# Patient Record
Sex: FEMALE | Race: WHITE | NOT HISPANIC OR LATINO | Employment: FULL TIME | URBAN - METROPOLITAN AREA
[De-identification: names, ages, dates, MRNs, and addresses within clinical notes are randomized per-mention and may not be internally consistent; named-entity substitution may affect disease eponyms.]

---

## 2020-03-24 ENCOUNTER — VIRTUAL VISIT (OUTPATIENT)
Dept: FAMILY MEDICINE | Facility: OTHER | Age: 35
End: 2020-03-24

## 2020-03-25 NOTE — PROGRESS NOTES
"Date: 2020 12:00:12  Clinician: Amrit Mendoza  Clinician NPI: [id number]  Patient: Zee Miller  Patient : 1985  Patient Address: [address] [place] [address]  Patient Phone: [phone]  Visit Protocol: URI  Patient Summary:  Zee is a 34 year old ( : 1985 ) female who initiated a Visit for COVID-19 (Coronavirus) evaluation and screening. When asked the question \"Please sign me up to receive news, health information and promotions. \", Zee responded \"No\".    Zee states her symptoms started 1-2 days ago.   Her symptoms consist of rhinitis, enlarged lymph nodes, myalgia, a sore throat, a cough, nasal congestion, malaise, and a headache. She is experiencing mild difficulty breathing with activities but can speak normally in full sentences.   Symptom details     Nasal secretions: The color of her mucus is clear, blood-tinged, and white.    Cough: Zee coughs a few times an hour and her cough is not more bothersome at night. Phlegm does not come into her throat when she coughs. She does not believe her cough is caused by post-nasal drip.     Sore throat: Zee reports having mild throat pain (1-3 on a 10 point pain scale), does not have exudate on her tonsils, and can swallow liquids. The lymph nodes in her neck are enlarged. A rash has not appeared on the skin since the sore throat started.     Headache: She states the headache is mild (1-3 on a 10 point pain scale).      Zee denies having ear pain, facial pain or pressure, wheezing, chills, teeth pain, and fever. She also denies taking antibiotic medication for the symptoms and having recent facial or sinus surgery in the past 60 days.   Precipitating events  Within the past week, Zee has not been exposed to someone with strep throat. She has not recently been exposed to someone with influenza. Zee has been in close contact with the following high risk individuals: immunocompromised people, adults " 65 or older, pregnant women, children under the age of 5, and people with asthma, heart disease or diabetes.   Pertinent COVID-19 (Coronavirus) information  Zee has not traveled internationally or to the areas where COVID-19 (Coronavirus) is widespread, including cruise ship travel in the last 14 days before the start of her symptoms.   Zee has not had a close contact with a laboratory-confirmed COVID-19 patient within 14 days of symptom onset. She has had a close contact with a suspected COVID-19 patient within 14 days of symptom onset. 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 She does not provide direct patient care.   Pertinent medical history  Zee does not get yeast infections when she takes antibiotics.   Zee needs a return to work/school note.   Weight: 150 lbs   Zee does not smoke or use smokeless tobacco.   She denies pregnancy and denies breastfeeding. She has menstruated in the past month.   Weight: 150 lbs    MEDICATIONS: lorazepam oral, propranolol oral, ALLERGIES: ibuprofen  Clinician Response:  Dear Zee,   Based on the information you have provided, you do have symptoms that are consistent with Coronavirus (COVID-19).   The coronavirus causes mild to severe respiratory illness with the most common symptoms including fever, cough and difficulty breathing. Unfortunately, many viruses cause similar symptoms and it can be difficult to distinguish between viruses, especially in mild cases, so we are presuming that anyone with cough or fever has coronavirus at this time.  Coronavirus/COVID-19 has reached the point of community spread in [place], meaning that we are finding the virus in people with no known exposure risk for jesus the virus. Given the increasing commonness  of coronavirus in the community we are no longer testing patients who are not critically ill.  If you are a health care worker, you should refer to your employee health office for instructions about testing and returning to work.  For everyone else who has cough or fever, you should assume you are infected with coronavirus. Since you will not be tested but have symptoms that may be consistent with coronavirus, the CDC recommends you stay in self-isolation until these three things have happened:    You have had no fever for at least 72 hours (that is three full days of no fever without the use of medicine that reduces fevers)    AND   Other symptoms have improved (for example, when your cough or shortness of breath have improved)   AND   At least 7 days have passed since your symptoms first appeared.   How to Isolate:    Isolate yourself at home.   Do Not allow any visitors  Do Not go to work or school  Do Not go to Taoist,  centers, shopping, or other public places.  Do Not shake hands.  Avoid close contact with others (hugging, kissing).   Protect Others:    Cover Your Mouth and Nose with a mask, disposable tissue or wash cloth to avoid spreading germs to others.  Wash your hands and face frequently with soap and water.   Managing Symptoms:    At this time, we primarily recommend Tylenol (Acetaminophen) for fever or pain. If you have liver or kidney problems, contact your primary care provider for instructions on use of tylenol. Adults can take 650 mg (two 325 mg pills) by mouth every 4-6 hours as needed OR 1,000 mg (two 500 mg pills) every 8 hours as needed. MAXIMUM DAILY DOSE: 3,000mg. For children, refer to dosing on bottle based on age or weight.   If you develop significant shortness of breath that prevents you from doing normal activities, please call 911 or proceed to the nearest emergency room and alert them immediately that you have been in self-isolation for possible coronavirus.   For more  information about COVID19 and options for caring for yourself at home, please visit the CDC website at https://www.cdc.gov/coronavirus/2019-ncov/about/steps-when-sick.htmlFor more options for care at [facility], please visit our website at [email or web address]     Diagnosis: Cough  Diagnosis ICD: R05

## 2020-08-20 LAB
DEPRECATED S PYO AG THROAT QL EIA: NEGATIVE
SPECIMEN SOURCE: NORMAL
SPECIMEN SOURCE: NORMAL
STREP GROUP A PCR: NOT DETECTED

## 2020-08-20 NOTE — PROGRESS NOTES
"Date: 2020 08:48:59  Clinician: Prosper Payton  Clinician NPI: [id number]  Patient: Zee [place]  Patient : 1985  Patient Address: [address] [place] [address]  Patient Phone: [phone]  Visit Protocol: URI  Patient Summary:  Zee is a 35 year old ( : 1985 ) female who initiated a Visit for cold, sinus infection, or influenza. When asked the question \"Please sign me up to receive news, health information and promotions. \", Zee responded \"No\".    Zee states her symptoms started 1-2 days ago.   Her symptoms consist of a headache, malaise, a sore throat, nasal congestion, and rhinitis.   Symptom details     Nasal secretions: The color of her mucus is yellow.    Sore throat: Zee reports having moderate throat pain (4-6 on a 10 point pain scale), has exudate on her tonsils, and can swallow liquids. She is not sure if the lymph nodes in her neck are enlarged. A rash has not appeared on the skin since the sore throat started.     Headache: She states the headache is mild (1-3 on a 10 point pain scale).      Zee denies having ear pain, chills, fever, wheezing, teeth pain, ageusia, diarrhea, cough, vomiting, nausea, myalgias, anosmia, and facial pain or pressure. She also denies having a sinus infection within the past year, having recent facial or sinus surgery in the past 60 days, and taking antibiotic medication in the past month. She is not experiencing dyspnea.   Precipitating events  Zee is not sure if she has been exposed to someone with strep throat.   Pertinent COVID-19 (Coronavirus) information  In the past 14 days, Zee has not worked in a congregate living setting.   She either works or volunteers as a healthcare worker or a , or works or volunteers in a healthcare facility. She does not provide direct patient care. 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 " [employer]   Zee also has not lived in a congregate living setting in the past 14 days. She lives with a healthcare worker.   Zee has not had a close contact with a laboratory-confirmed COVID-19 patient within 14 days of symptom onset.   Since December 2019, Zee and has had upper respiratory infection (URI) or influenza-like illness. Has not been diagnosed with lab-confirmed COVID-19 test      Date(s) of previous URI or influenza-like illness (free-text): End of February     Symptoms Zee experienced during previous URI or influenza-like illness as reported by the patient (free-text): Burning in chest, cough, slight fever        Pertinent medical history  Zee does not get yeast infections when she takes antibiotics.   Zee needs a return to work/school note.   Weight: 160 lbs   Zee does not smoke or use smokeless tobacco.   She denies pregnancy and denies breastfeeding. She is currently menstruating.   Additional information as reported by the patient (free text): I do believe that I just have a sore throat/ strep   Weight: 160 lbs    MEDICATIONS: lorazepam oral, propranolol oral, ALLERGIES: ibuprofen  Clinician Response:  Dear Zee,   .       Rapid Strep Test - [place]    I am sorry you are not feeling well. Based on your lab results, you do not have strep throat. This means your symptoms are caused by a virus and not the bacteria that causes strep throat. Antibiotic medications are not effective against a viral infection and will not help symptoms improve or make the condition less contagious.  The following tips will keep you as comfortable as possible while you recover:     Rest    Drink plenty of water and other liquids    Take a hot shower to loosen congestion    Use throat lozenges    Gargle with warm salt water (1/4 teaspoon of salt per 8 ounce glass of water)    Suck on frozen items such as popsicles or ice cubes    Drink hot tea with lemon and honey     Please be seen in a  clinic or urgent care if new symptoms develop, or symptoms become worse.  Call 911 or go to the emergency room if you suddenly develop a rash, are drooling or unable to swallow fluids, if you are having difficulty breathing, or feel that your throat is closing off.  Because strep throat can be easily spread to others, proof of evaluation by a provider is often requested before returning to work, school, or . The statement below summarizes my evaluation. Please print a copy of this Visit if written verification is needed.  Zee was evaluated for strep throat and lab testing was completed. As a result, strep throat was ruled out and symptoms are the result of a viral infection. Antibiotics were not prescribed because they are not an effective treatment for viral infections and will not make it less contagious. Zee can return to work, school, or  if she has not had a fever for 24 hours without the use of a fever-reducing medication and feels well enough for daily activities.   Diagnosis: Pain in throat  Diagnosis ICD: R07.0  [facility] Results: STRAR: Negative  No Group A streptococcal antigen detected by immunoassay. Confirmatory testing  in progress.  [employer] Secondary Results: STRPA: Not Detected  Group A Streptococcus DNA is not detected.  FDA approved assay performed using [employer] GeneXpert real-time PCR.

## 2020-08-21 ENCOUNTER — VIRTUAL VISIT (OUTPATIENT)
Dept: LAB | Facility: CLINIC | Age: 35
End: 2020-08-21

## 2020-08-21 DIAGNOSIS — R07.0 THROAT PAIN: Primary | ICD-10-CM

## 2021-05-01 ENCOUNTER — HEALTH MAINTENANCE LETTER (OUTPATIENT)
Age: 36
End: 2021-05-01

## 2021-05-15 ENCOUNTER — IMMUNIZATION (OUTPATIENT)
Dept: LAB | Facility: CLINIC | Age: 36
End: 2021-05-15
Payer: COMMERCIAL

## 2021-05-17 ENCOUNTER — OFFICE VISIT (OUTPATIENT)
Dept: FAMILY MEDICINE | Facility: CLINIC | Age: 36
End: 2021-05-17
Payer: COMMERCIAL

## 2021-05-17 VITALS
TEMPERATURE: 96.8 F | SYSTOLIC BLOOD PRESSURE: 120 MMHG | HEART RATE: 131 BPM | OXYGEN SATURATION: 98 % | WEIGHT: 164 LBS | BODY MASS INDEX: 24.86 KG/M2 | DIASTOLIC BLOOD PRESSURE: 80 MMHG | RESPIRATION RATE: 16 BRPM | HEIGHT: 68 IN

## 2021-05-17 DIAGNOSIS — H92.03 OTALGIA, BILATERAL: Primary | ICD-10-CM

## 2021-05-17 PROCEDURE — 99213 OFFICE O/P EST LOW 20 MIN: CPT | Performed by: NURSE PRACTITIONER

## 2021-05-17 RX ORDER — PROPRANOLOL HYDROCHLORIDE 20 MG/1
20 TABLET ORAL 2 TIMES DAILY
COMMUNITY
Start: 2019-06-05 | End: 2023-01-25

## 2021-05-17 RX ORDER — LORAZEPAM 0.5 MG/1
.5-1 TABLET ORAL
COMMUNITY
Start: 2017-01-13 | End: 2021-11-17

## 2021-05-17 SDOH — HEALTH STABILITY: MENTAL HEALTH: HOW OFTEN DO YOU HAVE 6 OR MORE DRINKS ON ONE OCCASION?: NOT ASKED

## 2021-05-17 SDOH — HEALTH STABILITY: MENTAL HEALTH: HOW OFTEN DO YOU HAVE A DRINK CONTAINING ALCOHOL?: 2-4 TIMES A MONTH

## 2021-05-17 SDOH — HEALTH STABILITY: MENTAL HEALTH: HOW MANY STANDARD DRINKS CONTAINING ALCOHOL DO YOU HAVE ON A TYPICAL DAY?: 1 OR 2

## 2021-05-17 ASSESSMENT — MIFFLIN-ST. JEOR: SCORE: 1492.4

## 2021-05-17 NOTE — PATIENT INSTRUCTIONS
Patient Education     Treating TMJ Disorders with Heat and Cold  Heat therapy  Use heat for comfort during exercise. Heat increases blood flow and helps the tissues to relax. This makes movement easier. Moving the muscles and joints will restore them to full function.  You may stretch during heat therapy if it does not increase your pain, or if your therapist asks you to. After heat therapy, exercise as directed.  To apply heat, do one of the following:    Use a Bed Buddy Hot and Cold Pack over the jaw muscles. You may buy this from the drug store. Follow the heating directions on the package.    Use a damp hand towel.  1. Wring out the towel and place it in a microwave for 20 to 30 seconds.  2. Gently shake out any hot spots before placing it over the jaw muscles. Be sure to include the muscles above and below the jaw joints.  3. Reheat as needed for a total heating time of 15 to 20 minutes.  Cold therapy  Cold should be your first treatment for the first 24 to 48 hours after an injury or surgery.  You can also use cold to relieve inflammation (when your jaw feels swollen, tender and warmer than normal). It helps with pain and discomfort caused by exercise or severe muscle spasms. If you use cold before doing gentle stretches, it may help prevent pain.  To apply cold, try one of the following:    Buy a cold gel-pack from the drug store. Wrap it in a damp towel before placing it on the skin.    Use a plastic bag filled with ice.  1. Fill a Ziplock bag with ice chips and wrap it in damp towel or washcloth.  2. Place the ice pack over the jaw muscles. Be sure to include the muscles above and below the jaw joints.  3. Hold it in place 5 to 10 minutes or until the skin feels numb.    Use ice massage.  1. Freeze water in a small paper or Styrofoam cup.  2. Remove the paper from the rim of the cup.  3. Stroke the ice over the muscles and joints. Stop when the area feels numb.  When using cold therapy, you will notice  that the skin will first feel cold, then painful. The feeling will progress to a deep ache and finally to numbness.   When you are numb, remove the cold.  Contrast packs  The use of contrast packs--switching off between heat and cold--helps to reduce severe muscle spasm.  1. First use heat for 4 minutes, then cold for 1 minute.  2. Keep changing from hot to cold for 20 to 30 minutes.  3. End with the use of heat for 10 minutes.  For informational purposes only. Not to replace the advice of your health care provider.   Copyright   2007 [employer] [facility]. All rights reserved. [employer] [id number] - REV 06/15.

## 2021-10-10 ENCOUNTER — HEALTH MAINTENANCE LETTER (OUTPATIENT)
Age: 36
End: 2021-10-10

## 2021-11-17 ENCOUNTER — OFFICE VISIT (OUTPATIENT)
Dept: FAMILY MEDICINE | Facility: CLINIC | Age: 36
End: 2021-11-17
Payer: COMMERCIAL

## 2021-11-17 VITALS
HEIGHT: 68 IN | SYSTOLIC BLOOD PRESSURE: 120 MMHG | DIASTOLIC BLOOD PRESSURE: 78 MMHG | RESPIRATION RATE: 20 BRPM | OXYGEN SATURATION: 98 % | HEART RATE: 77 BPM | TEMPERATURE: 98.5 F | BODY MASS INDEX: 25.54 KG/M2 | WEIGHT: 168.5 LBS

## 2021-11-17 DIAGNOSIS — F41.9 ANXIETY: ICD-10-CM

## 2021-11-17 DIAGNOSIS — R46.89 BEHAVIOR CONCERN: Primary | ICD-10-CM

## 2021-11-17 DIAGNOSIS — F41.0 PANIC ATTACK: ICD-10-CM

## 2021-11-17 DIAGNOSIS — Z79.899 CONTROLLED SUBSTANCE AGREEMENT SIGNED: ICD-10-CM

## 2021-11-17 DIAGNOSIS — F33.0 MILD RECURRENT MAJOR DEPRESSION (H): ICD-10-CM

## 2021-11-17 DIAGNOSIS — Z97.5 NEXPLANON IN PLACE: ICD-10-CM

## 2021-11-17 PROCEDURE — 99214 OFFICE O/P EST MOD 30 MIN: CPT | Performed by: PHYSICIAN ASSISTANT

## 2021-11-17 PROCEDURE — 96127 BRIEF EMOTIONAL/BEHAV ASSMT: CPT | Performed by: PHYSICIAN ASSISTANT

## 2021-11-17 RX ORDER — LOPERAMIDE HCL 2 MG
2 CAPSULE ORAL PRN
COMMUNITY
Start: 2020-06-22

## 2021-11-17 ASSESSMENT — ANXIETY QUESTIONNAIRES
2. NOT BEING ABLE TO STOP OR CONTROL WORRYING: SEVERAL DAYS
6. BECOMING EASILY ANNOYED OR IRRITABLE: NOT AT ALL
IF YOU CHECKED OFF ANY PROBLEMS ON THIS QUESTIONNAIRE, HOW DIFFICULT HAVE THESE PROBLEMS MADE IT FOR YOU TO DO YOUR WORK, TAKE CARE OF THINGS AT HOME, OR GET ALONG WITH OTHER PEOPLE: SOMEWHAT DIFFICULT
GAD7 TOTAL SCORE: 4
3. WORRYING TOO MUCH ABOUT DIFFERENT THINGS: NOT AT ALL
5. BEING SO RESTLESS THAT IT IS HARD TO SIT STILL: SEVERAL DAYS
7. FEELING AFRAID AS IF SOMETHING AWFUL MIGHT HAPPEN: SEVERAL DAYS
1. FEELING NERVOUS, ANXIOUS, OR ON EDGE: SEVERAL DAYS

## 2021-11-17 ASSESSMENT — MIFFLIN-ST. JEOR: SCORE: 1507.81

## 2021-11-17 ASSESSMENT — PATIENT HEALTH QUESTIONNAIRE - PHQ9: 5. POOR APPETITE OR OVEREATING: NOT AT ALL

## 2021-11-17 NOTE — LETTER
[facility] [place]  11/17/21  Patient: Zee Miller  YOB: 1985  Medical Record Number: [id number]                                                                                  Non-Opioid Controlled Substance Agreement    This is an agreement between you and your provider regarding safe and appropriate use of controlled substances prescribed by your care team. Controlled substances are?medicines that can cause physical and mental dependence (abuse).     There are strict laws about having and using these medicines. We here at [facility] are  committed to working with you in your efforts to get better. To support you in this work, we'll help you schedule regular office appointments for medicine refills. If we must cancel or change your appointment for any reason, we'll make sure you have enough medicine to last until your next appointment.     As a Provider, I will:     Listen carefully to your concerns while treating you with respect.     Recommend a treatment plan that I believe is in your best interest and may involve therapies other than medicine.      Talk with you often about the possible benefits and the risk of harm of any medicine that we prescribe for you.    Assess the safety of this medicine and check how well it works.      Provide a plan on how to taper (discontinue or go off) using this medicine if the decision is made to stop its use.      ::  As a Patient, I understand controlled substances:       Are prescribed by my care provider to help me function or work and manage my condition(s).?    Are strong medicines and can cause serious side effects.       Need to be taken exactly as prescribed.?Combining controlled substances with certain medicines or chemicals (such as illegal drugs, alcohol, sedatives, sleeping pills, and benzodiazepines) can be dangerous or even fatal.? If I stop taking my medicines suddenly, I may have severe withdrawal symptoms.     The  risks, benefits, and side effects of these medicine(s) were explained to me. I agree that:    1. I will take part in other treatments as advised by my care team. This may be psychiatry or counseling, physical therapy, behavioral therapy, group treatment or a referral to specialist.    2. I will keep all my appointments and understand this is part of the monitoring of controlled substances.?My care team may require an office visit for EVERY controlled substance refill. If I miss appointments or don t follow instructions, my care team may stop my medicine    3. I will take my medicines as prescribed. I will not change the dose or schedule unless my care team tells me to. There will be no refills if I run out early.      4. I may be asked to come to the clinic and complete a urine drug test or complete a pill count. If I don t give a urine sample or participate in a pill count, the care team may stop my medicine.    5. I will only receive controlled substance prescriptions from this clinic. If I am treated by another provider, I will tell them that I am taking controlled substances and that I have a treatment agreement with this provider. I will inform my [facility] care team within one business day if I am given a prescription for any controlled substance by another healthcare provider. My [facility] care team can contact other providers and pharmacists about my use of any medicines.    6. It is up to me to make sure that I don't run out of my medicines on weekends or holidays.?If my care team is willing to refill my prescription without a visit, I must request refills only during office hours. Refills may take up to 3 business days to process. I will use one pharmacy to fill all my controlled substance prescriptions. I will notify the clinic about any changes to my insurance or medicine availability.    7. I am responsible for my prescriptions. If the medicine/prescription is lost, stolen or destroyed,  it will not be replaced.?I also agree not to share controlled substance medicines with anyone.     8. I am aware I should not use any illegal or recreational drugs. I agree not to drink alcohol unless my care team says I can.     9. If I enroll in the [place] Medical Cannabis program, I will tell my care team before my next refill.    10. I will tell my care team right away if I become pregnant, have a new medical problem treated outside of my regular clinic, or have a change in my medicines.     11. I understand that this medicine can affect my thinking, judgment and reaction time.? Alcohol and drugs affect the brain and body, which can affect the safety of my driving. Being under the influence of alcohol or drugs can affect my decision-making, behaviors, personal safety and the safety of others. Driving while impaired (DWI) can occur if a person is driving, operating or in physical control of a car, motorcycle, boat, snowmobile, ATV, motorbike, off-road vehicle or any other motor vehicle ([place] Statute 169A.20). I understand the risk if I choose to drive or operate any vehicle or machinery.    I understand that if I do not follow any of the conditions above, my prescriptions or treatment may be stopped or changed.   I agree that my provider, clinic care team and pharmacy may work with any city, state or federal law enforcement agency that investigates the misuse, sale or other diversion of my controlled medicine. I will allow my provider to discuss my care with, or share a copy of, this agreement with any other treating provider, pharmacy or emergency room where I receive care.     I have read this agreement and have asked questions about anything I did not understand.    ________________________________________________________  Patient Signature - Zee Miller     ___________________                   Date     ________________________________________________________  Provider Signature - Aubrie KAUR  Suzette, PA-C       ___________________                   Date     ________________________________________________________  Witness Signature (required if provider not present while patient signing)          ___________________                   Date

## 2021-11-17 NOTE — PROGRESS NOTES
Assessment & Plan     Zee was seen today for consult.    Diagnoses and all orders for this visit:    Behavior concern  -     MENTAL HEALTH REFERRAL  - Adult; Assessments and Testing; ADHD; [facility] [phone]; We will contact you to schedule the appointment or please call with any questions; Future    Anxiety  -     MENTAL HEALTH REFERRAL  - Adult; Assessments and Testing; ADHD; [facility] [phone]; We will contact you to schedule the appointment or please call with any questions; Future  -     propranolol (INDERAL) 20 MG tablet; Take 1 tablet (20 mg) by mouth 2 times daily  -     LORazepam (ATIVAN) 0.5 MG tablet; Take 1 tablet (0.5 mg) by mouth daily as needed for anxiety (and panic attacks)    Panic attack  -     LORazepam (ATIVAN) 0.5 MG tablet; Take 1 tablet (0.5 mg) by mouth daily as needed for anxiety (and panic attacks)    Mild recurrent major depression (H)    Controlled substance agreement signed    Nexplanon in place - placed at [facility] 2/2019       New pt transition care as previous PCP no longer available. Concerned for possible ADHD so referral given to evaluation for further. Hx complicated by previously reported Dx of anxiety, depression and panic attacks currently on propranolol 20mg BID and ativan .5mg for panic attacks. Estimates use no more than 4-5x per month. Updated CSA, no concerns on . Discussed consideration to trial of another selective serotonin reuptake inhibitor especially if ADHD evaluation returns negative. Could consider sertraline. Pt will consider. Recheck 6 months at routine health physical.    Return in about 6 months (around 5/17/2022) for Preventive Visit, Mood Recheck.    Aubrie Bradford PA-C  [facility] [place]    Barrett Bullard is a 36 year old who presents for the following health issues     Previous provider moved back to [place] - Dr. Omar Barber at . No longer available.    Patient  "here to discuss possible ADHD; never been given this diagnosis in the past. Admits she's always thought it was a problem, but now is affecting ability to work.   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 Role seems to be changing consistently - they keep adding things on and less time to do things.  Has noticed increased concentration difficulties - catches self \"spacing out\", hard-time multitasking ie) can't type and listen at same time.  Lists are required to get things done and is no longer allowed to do these per scheduling protocols, thus, having a hard time following through with new expectations.  Constant fidgity  Affecting work    Wonders if mom has it, but was never tested for it.  Left public school and went to an alternative school in middle of 10th grade year due to similar concerns noted above. Had easier time doing things at her own pace.  PMHX significant for anxiety and depression - taking propranolol 20mg BID and ativan .5mg for break through anxiety - can go through 2 weeks without it then have a really bad week. Estimates about 4 pills per month - typically for panic attacks - can feel her heart beating, a lot of crying and despair.   Has tried 3 other meds, but didn't like how she functioned on - wellbutrin, citalopram       Current Outpatient Medications   Medication     etonogestrel (NEXPLANON) 68 MG IMPL     loperamide (IMODIUM) 2 MG capsule     LORazepam (ATIVAN) 0.5 MG tablet     propranolol (INDERAL) 20 MG tablet     propranolol (INDERAL) 20 MG tablet     No current facility-administered medications for this visit.     Allergies   Allergen Reactions     Acetaminophen Hives     Ibuprofen Hives     Aspirin Hives       Review of Systems   Constitutional, HEENT, cardiovascular, pulmonary, gi and gu, psych, neuro systems are negative, except as otherwise noted.      Objective    /78   Pulse 77   Temp 98.5  F (36.9  C) (Tympanic)   Resp 20 " "  Ht 1.727 m (5' 8\")   Wt 76.4 kg (168 lb 8 oz)   SpO2 98%   BMI 25.62 kg/m    Body mass index is 25.62 kg/m .  Physical Exam   GENERAL: healthy, alert and no distress  EYES: Eyes grossly normal to inspection, PERRL and conjunctivae and sclerae normal  RESP: lungs clear to auscultation - no rales, rhonchi or wheezes  CV: regular rates and rhythm and no murmur, click or rub  NEURO: Normal strength and tone, mentation intact and speech normal  PSYCH: mentation appears normal and anxious            "

## 2021-11-18 ASSESSMENT — ANXIETY QUESTIONNAIRES: GAD7 TOTAL SCORE: 4

## 2021-11-18 ASSESSMENT — PATIENT HEALTH QUESTIONNAIRE - PHQ9: SUM OF ALL RESPONSES TO PHQ QUESTIONS 1-9: 9

## 2021-11-21 PROBLEM — F33.0 MILD RECURRENT MAJOR DEPRESSION (H): Status: ACTIVE | Noted: 2021-11-21

## 2021-11-21 PROBLEM — F41.0 PANIC ATTACK: Status: ACTIVE | Noted: 2021-11-21

## 2021-11-21 RX ORDER — LORAZEPAM 0.5 MG/1
0.5 TABLET ORAL DAILY PRN
Qty: 30 TABLET | Refills: 0 | Status: SHIPPED | OUTPATIENT
Start: 2021-11-21 | End: 2023-01-25

## 2021-11-21 RX ORDER — PROPRANOLOL HYDROCHLORIDE 20 MG/1
20 TABLET ORAL 2 TIMES DAILY
Qty: 180 TABLET | Refills: 3 | Status: SHIPPED | OUTPATIENT
Start: 2021-11-21 | End: 2022-12-22

## 2021-12-07 ENCOUNTER — E-VISIT (OUTPATIENT)
Dept: FAMILY MEDICINE | Facility: CLINIC | Age: 36
End: 2021-12-07
Payer: COMMERCIAL

## 2021-12-07 DIAGNOSIS — R49.1 VOICE ABSENCE: Primary | ICD-10-CM

## 2021-12-07 PROCEDURE — 99421 OL DIG E/M SVC 5-10 MIN: CPT | Performed by: PHYSICIAN ASSISTANT

## 2021-12-07 NOTE — LETTER
[address] [address]  [phone]       December 7, 2021    Zee Miller  [address] [place] [id number]    To Whom it May Concern:    The above patient is unable to work in a setting where talking on the phone/conversations are required due to an underlying medical issue.  Once her voice has recovered, she can return to work and this capacity.  Please contact me with questions or concerns.      Sincerely,    Mala Coon PA-C

## 2021-12-08 NOTE — PATIENT INSTRUCTIONS
Patient Education     Laryngitis    Laryngitis is a swelling of the tissues around the vocal cords. Symptoms include a hoarse (scratchy) voice. Or your voice may be gone for a few days or longer. This may be caused by a viral illness, such as a head or chest cold. It may also be due to overuse and strain of your voice. Smoking, drinking alcohol, acid reflux, allergies, or inhaling harsh chemicals may also lead to symptoms. This condition will usually go away in 1 to 2 weeks.   Home care    Rest your voice until it recovers. Talk as little as possible. If your symptoms are severe, rest at home for a day or so.    Moist air may help your symptoms. Try breathing cool steam from a humidifier or vaporizer. Or breathe air from a steamy shower.    Drink plenty of fluids to stay well hydrated.    Don't smoke    Follow-up care  Follow up with your healthcare provider or this facility if you are not better after 1 week. If your hoarse voice lasts more than 2 weeks, you may need to see an otolaryngologist. This is a doctor who treats diseases and disorders of the ear, nose, and throat (ENT). Seeing this doctor is especially important if you have a history of alcohol or tobacco use.   When to seek medical advice  Contact your healthcare provider right away if you have any of the following:     Symptoms that get worse    Severe pain with swallowing    Trouble opening your mouth    Neck swelling, neck pain, or trouble moving your neck    Fever of 100.4 F (38. C) or higher, or as directed by your healthcare provider    Symptoms do not go away in 2 weeks  Call 911  Call 911 or seek immediate medical care if you have any of the following:     Noisy breathing or trouble breathing    Drooling or not able to swallow    Not able to talk    Feeling dizzy or lightheaded  [employer] last reviewed this educational content on 4/1/2020 2000-2021 [employer]. All rights reserved. This information is not intended as a  substitute for professional medical care. Always follow your healthcare professional's instructions.

## 2022-01-26 ENCOUNTER — E-VISIT (OUTPATIENT)
Dept: URGENT CARE | Facility: CLINIC | Age: 37
End: 2022-01-26
Payer: COMMERCIAL

## 2022-01-26 ENCOUNTER — MYC MEDICAL ADVICE (OUTPATIENT)
Dept: FAMILY MEDICINE | Facility: CLINIC | Age: 37
End: 2022-01-26

## 2022-01-26 DIAGNOSIS — J02.9 ACUTE PHARYNGITIS, UNSPECIFIED ETIOLOGY: Primary | ICD-10-CM

## 2022-01-26 DIAGNOSIS — Z20.822 SUSPECTED COVID-19 VIRUS INFECTION: Primary | ICD-10-CM

## 2022-01-26 PROCEDURE — 99421 OL DIG E/M SVC 5-10 MIN: CPT | Performed by: EMERGENCY MEDICINE

## 2022-01-26 NOTE — PATIENT INSTRUCTIONS
Ale[place],    Based on your responses, you may have coronavirus (COVID-19). This illness can cause fever, cough and trouble breathing. Many people get a mild case and get better on their own. Some people can get very sick.    Will I be tested for COVID-19?  We would like to test you for COVID-19 virus. I have placed orders for this test.     For all employees or close contacts (except [employer] and [employer] - see below), go to your [employer] home page and scroll down to the section that says  You have an appointment that needs to be scheduled  and click the large green button that says  Schedule Now  and follow the steps to find the next available opening.     If you are unable to complete these steps or if you cannot find any available times, please call [phone] to schedule employee testing.     [employer] employees or close contacts, please call [phone].   [place] ([employer]) employees or close contacts call [phone].    Return to work/school/ guidance:  Please let your workplace manager and staffing office know when your isolation ends.       If you receive a positive COVID-19 test result, follow the guidance of the those who are giving you the results. Usually the return to work is 10 days from symptom onset or positive test date, (or in some cases 20 days if you are immunocompromised). If your symptoms started after your positive test, the 10 days should start when your symptoms started.   o If you work at [employer], you must also be cleared by Employee Occupational Health and Safety to return to work.      If you receive a negative COVID-19 test result and did not have a high risk exposure to someone with a known positive COVID-19 test, you can return to work once you're free of fever for 24 hours without fever-reducing medication and your symptoms are improving or resolved.    If you receive a negative COVID-19 test and had a high-risk exposure to someone who has tested positive for  COVID-19 then you can return to work 14 days after your last contact with the positive individual. Follow quarantine guidance given by your doctor or public health officials.     Sign up for [employer].   We know it's scary to hear that you might have COVID-19. We want to track your symptoms to make sure you're okay over the next 2 weeks. Please look for an email from [employer]--this is a free, online program that we'll use to keep in touch. To sign up, follow the link in the email you will receive. Learn more at [email or web address]        How can I take care of myself?  Over the counter medications may help with your symptoms like congestion, cough, chills, or fever.   There are not many effective prescription treatments for early COVID-19. Hydroxychloroquine, ivermectin, and azithromycin are not effective or recommended for COVID-19.    If your symptoms started in the last 10 days, you may be able to receive a treatment with monoclonal antibodies. This treatment can lower your risk of severe illness and going to the hospital. It is given through an IV or under your skin (subcutaneous) and must be given at an infusion center. You must be 12 or older, weight at least 88 pounds, and have a positive COVID-19 test.    If you would like to sign up to be considered to receive the monoclonal antibody medicine, please complete a participation form through the [facility] here: MNRAP ([email or web address]). You may also call the [facility] COVID-19 Public Hotline at 1-375.156.2206 (open Mon-Fri: 9am-7pm and Sat: 10am-6pm).     Not all people who are eligible will receive the medicine, since supply is limited. You will be contacted in the next 1 to 2 business days only if you are selected. If you do not receive a call, you have not been selected to receive the medicine. If you have any questions about this medication, please contact your primary care  provider. For more information, see [email or web address]/[email or web address]/[email or web address]      Get lots of rest. Drink extra fluids (unless a doctor has told you not to)    Take Tylenol (acetaminophen) or ibuprofen for fever or pain. If you have liver or kidney problems, ask your family doctor if it's okay to take Tylenol o ibuprofen    Take over the counter medications for your symptoms, as directed by your doctor. You may also talk to your pharmacist.      If you have other health problems (like cancer, heart failure, an organ transplant or severe kidney disease): Call your specialty clinic if you don't feel better in the next 2 days.    Know when to call 911. Emergency warning signs include:  o Trouble breathing or shortness of breath  o Pain or pressure in the chest that doesn't go away  o Feeling confused like you haven't felt before, or not being able to wake up  o Bluish-colored lips or face    Where can I get more information?    [facility] - About COVID-19: [email or web address]     CDC - What to Do If You're Sick:     www.cdc.gov/coronavirus/2019-ncov/about/steps-when-sick.html    CDC - Ending Home Isolation:  https://www.cdc.gov/coronavirus/2019-ncov/your-health/quarantine-isolation.html     CDC - Caring for Someone:  www.cdc.gov/coronavirus/2019-ncov/if-you-are-sick/care-for-someone.html    [facility] clinical trials (COVID-19 research studies): clinicalaffairs.[facility]/n-clinical-trials    Below are the COVID-19 hotlines at the [facility] of Health ([facility]). Interpreters are available.  o For health questions: Call 604-858-6228 or [phone] (7 a.m. to 7 p.m.)  o For questions about schools and childcare: Call 199-570-3925 or [phone] (7 a.m. to 7 p.m.)  January 26, 2022  RE:  Zee Miller                                                                                                                  [address] [place] [address]  [id number]      To whom it may concern:    I evaluated Zee Miller on January 26, 2022. Zee Miller should be excused from work/school.     They should let their workplace manager and staffing office know when their quarantine ends.    We can not give an exact date as it depends on the information below. They can calculate this on their own or work with their manager/staffing office to calculate this. (For example if they were exposed on 10/04, they would have to quarantine for 14 full days. That would be through 10/18. They could return on 10/19.)    Quarantine Guidelines:    If patient receives a positive COVID-19 test result, they should follow the guidance of those who are giving the results. Usually the return to work is 10 (or in some cases 20 days from symptom onset.) If they work at [employer], they must be cleared by Employee Occupational Health and Safety to return to work.      If patient receives a negative COVID-19 test result and did not have a high risk exposure to someone with a known positive COVID-19 test, they can return to work once they're free of fever for 24 hours without fever-reducing medication and their symptoms are improving or resolved.    If patient receives a negative COVID-19 test and if they had a high risk exposure to someone who has tested positive for COVID-19 then they can return to work 14 days after their last contact with the positive individual    Note: If there is ongoing exposure to the covid positive person, this quarantine period may be longer than 14 days. (For example, if they are continually exposed to their child, who tested positive and cannot isolate from them, then the quarantine of 7-14 days can't start until their child is no longer contagious. This is typically 10 days from onset to the child's symptoms. So the total duration may be 17-24 days in this case.)     Sincerely,  Zack Cai MD          o

## 2022-05-22 ENCOUNTER — HEALTH MAINTENANCE LETTER (OUTPATIENT)
Age: 37
End: 2022-05-22

## 2022-09-24 ENCOUNTER — HEALTH MAINTENANCE LETTER (OUTPATIENT)
Age: 37
End: 2022-09-24

## 2022-11-07 ENCOUNTER — E-VISIT (OUTPATIENT)
Dept: FAMILY MEDICINE | Facility: CLINIC | Age: 37
End: 2022-11-07
Payer: COMMERCIAL

## 2022-11-07 DIAGNOSIS — R51.9 HEADACHE, UNSPECIFIED HEADACHE TYPE: Primary | ICD-10-CM

## 2022-11-07 PROCEDURE — 99207 PR NON-BILLABLE SERV PER CHARTING: CPT | Performed by: PHYSICIAN ASSISTANT

## 2022-11-17 NOTE — PROGRESS NOTES
"  Assessment & Plan     Otalgia, bilateral  Normal reassuring exam.   Ears without signs of infection nor cerumen impaction.   Eustachian tube dysfunction discussed.  Allergies is also a possibility discussed.  Could trail Sudafed twice daily for the next 5 to 7 days.  Instructed this is behind the counter with the pharmacy however does not need a prescription;  Could also use a nasal spray Nasonex, Nasacort, or Flonase avoid Afrin type products due to rebound nasal congestion. The combination of both can help with ear pain pressure.  Written education provided.   Encouraged her to be seen if symptoms persist or worsen.  Encourage follow-up as well as wellness examination examination.  Zee verbalizes understanding of plan of care and is in agreement.     Return in about 2 weeks (around 5/31/2021) for Recheck, Wellness exam fasting labs.    Michaela Hicks, [facility] ABRAHAM Bullard is a 35 year old who presents for the following health issues  HPI     Acute Illness  Acute illness concerns: EAR PAIN  Onset/Duration: 2 wks  Symptoms:  Fever: no  Chills/Sweats: no  Headache (location?): no  Sinus Pressure: no  Conjunctivitis:  no  Ear Pain: YES: left  Rhinorrhea: no  Congestion: no  Sore Throat: no  Cough: no  Wheeze: no  Decreased Appetite: no  Nausea: no  Vomiting: no  Diarrhea: no  Dysuria/Freq.: no  Dysuria or Hematuria: no  Fatigue/Achiness: no  Sick/Strep Exposure: no  Therapies tried and outcome: None      Review of Systems   Constitutional, HEENT, cardiovascular, pulmonary, GI, , musculoskeletal, neuro, skin, endocrine and psych systems are negative, except as otherwise noted in the HPI.      Objective    /80   Pulse 131   Temp 96.8  F (36  C)   Resp 16   Ht 1.727 m (5' 8\")   Wt 74.4 kg (164 lb)   LMP 04/26/2021   SpO2 98%   BMI 24.94 kg/m    Body mass index is 24.94 kg/m .  Physical Exam   GENERAL: healthy, alert and no distress  EYES: Eyes grossly " normal to inspection, PERRL and conjunctivae and sclerae normal  HENT: ear canals and TM's normal, nose and mouth without ulcers or lesions  NECK: no adenopathy, no asymmetry, masses, or scars and thyroid normal to palpation  RESP: lungs clear to auscultation - no rales, rhonchi or wheezes  CV: regular rate and rhythm, normal S1 S2, no S3 or S4, no murmur, click or rub, no peripheral edema and peripheral pulses strong  ABDOMEN: soft, nontender, no hepatosplenomegaly, no masses and bowel sounds normal  SKIN: no suspicious lesions or rashes  PSYCH: mentation appears normal, affect normal/bright           Yes

## 2022-12-05 DIAGNOSIS — F41.9 ANXIETY: ICD-10-CM

## 2022-12-05 NOTE — LETTER
Hello,            We received your request for a refill of propranolol (INDERAL) 20 MG tablet. We have previously attempted to reach you because we see that you are due for your annual fasting  preventative visit and blood pressure check. Once you are scheduled for that, we will be able to send through your request.            You can schedule the appointment via [facility] or by calling us at [phone].          Thank you!           [facility]

## 2022-12-06 NOTE — TELEPHONE ENCOUNTER
Routing refill request to provider for review/approval because:   Beta-Blockers Protocol Failed 12/05/2022 12:39 AM   Protocol Details  Blood pressure under 140/90 in past 12 months      11/17/2021       Please call to schedule fasting PX and BP check     Thank you     Viv Meier RN, BSN  [facility]

## 2022-12-22 RX ORDER — PROPRANOLOL HYDROCHLORIDE 20 MG/1
TABLET ORAL
Qty: 180 TABLET | Refills: 0 | Status: SHIPPED | OUTPATIENT
Start: 2022-12-22 | End: 2022-12-23

## 2022-12-23 RX ORDER — PROPRANOLOL HYDROCHLORIDE 20 MG/1
20 TABLET ORAL 2 TIMES DAILY
Qty: 180 TABLET | Refills: 0 | Status: SHIPPED | OUTPATIENT
Start: 2022-12-23 | End: 2023-01-25

## 2022-12-23 NOTE — TELEPHONE ENCOUNTER
Patient scheduled med check    Future Appointments   Date Time Provider Department Center   1/25/2023  5:30 PM Jayesh Berumen MD [facility]       Propranolol was signed, however did not go through successfully. Medication resent.     ISABELA PEÑA RN on 12/23/2022 at 3:49 PM   [facility]

## 2023-01-25 ENCOUNTER — OFFICE VISIT (OUTPATIENT)
Dept: INTERNAL MEDICINE | Facility: CLINIC | Age: 38
End: 2023-01-25
Payer: COMMERCIAL

## 2023-01-25 VITALS
HEIGHT: 68 IN | TEMPERATURE: 97.9 F | SYSTOLIC BLOOD PRESSURE: 124 MMHG | RESPIRATION RATE: 18 BRPM | WEIGHT: 171 LBS | BODY MASS INDEX: 25.91 KG/M2 | DIASTOLIC BLOOD PRESSURE: 83 MMHG | HEART RATE: 124 BPM | OXYGEN SATURATION: 100 %

## 2023-01-25 DIAGNOSIS — F41.9 ANXIETY: Primary | ICD-10-CM

## 2023-01-25 DIAGNOSIS — Z80.8 FAMILY HISTORY OF THYROID CANCER: ICD-10-CM

## 2023-01-25 DIAGNOSIS — F33.0 MILD RECURRENT MAJOR DEPRESSION (H): ICD-10-CM

## 2023-01-25 PROCEDURE — 99214 OFFICE O/P EST MOD 30 MIN: CPT | Performed by: INTERNAL MEDICINE

## 2023-01-25 PROCEDURE — 36415 COLL VENOUS BLD VENIPUNCTURE: CPT | Performed by: INTERNAL MEDICINE

## 2023-01-25 PROCEDURE — 84443 ASSAY THYROID STIM HORMONE: CPT | Performed by: INTERNAL MEDICINE

## 2023-01-25 PROCEDURE — 96127 BRIEF EMOTIONAL/BEHAV ASSMT: CPT | Performed by: INTERNAL MEDICINE

## 2023-01-25 PROCEDURE — 84439 ASSAY OF FREE THYROXINE: CPT | Performed by: INTERNAL MEDICINE

## 2023-01-25 PROCEDURE — 80048 BASIC METABOLIC PNL TOTAL CA: CPT | Performed by: INTERNAL MEDICINE

## 2023-01-25 RX ORDER — PROPRANOLOL HYDROCHLORIDE 20 MG/1
20 TABLET ORAL 2 TIMES DAILY
Qty: 180 TABLET | Refills: 3 | Status: SHIPPED | OUTPATIENT
Start: 2023-01-25 | End: 2024-01-29

## 2023-01-25 RX ORDER — VENLAFAXINE HYDROCHLORIDE 75 MG/1
75 CAPSULE, EXTENDED RELEASE ORAL DAILY
Qty: 30 CAPSULE | Refills: 1 | Status: SHIPPED | OUTPATIENT
Start: 2023-01-25 | End: 2023-02-23

## 2023-01-25 RX ORDER — LORAZEPAM 0.5 MG/1
0.5 TABLET ORAL DAILY PRN
Qty: 30 TABLET | Refills: 0 | Status: SHIPPED | OUTPATIENT
Start: 2023-01-25 | End: 2023-11-17

## 2023-01-25 RX ORDER — VENLAFAXINE HYDROCHLORIDE 37.5 MG/1
37.5 CAPSULE, EXTENDED RELEASE ORAL DAILY
Qty: 7 CAPSULE | Refills: 0 | Status: SHIPPED | OUTPATIENT
Start: 2023-01-25 | End: 2023-02-23 | Stop reason: DRUGHIGH

## 2023-01-25 ASSESSMENT — ENCOUNTER SYMPTOMS
DECREASED CONCENTRATION: 1
RESPIRATORY NEGATIVE: 1
NERVOUS/ANXIOUS: 1
APPETITE CHANGE: 0
ACTIVITY CHANGE: 1
CARDIOVASCULAR NEGATIVE: 1
GASTROINTESTINAL NEGATIVE: 1
DYSPHORIC MOOD: 1
SLEEP DISTURBANCE: 1
NEUROLOGICAL NEGATIVE: 1
FATIGUE: 1

## 2023-01-25 ASSESSMENT — PATIENT HEALTH QUESTIONNAIRE - PHQ9
SUM OF ALL RESPONSES TO PHQ QUESTIONS 1-9: 11
5. POOR APPETITE OR OVEREATING: SEVERAL DAYS

## 2023-01-25 ASSESSMENT — ANXIETY QUESTIONNAIRES
7. FEELING AFRAID AS IF SOMETHING AWFUL MIGHT HAPPEN: MORE THAN HALF THE DAYS
5. BEING SO RESTLESS THAT IT IS HARD TO SIT STILL: SEVERAL DAYS
GAD7 TOTAL SCORE: 9
GAD7 TOTAL SCORE: 9
2. NOT BEING ABLE TO STOP OR CONTROL WORRYING: SEVERAL DAYS
6. BECOMING EASILY ANNOYED OR IRRITABLE: MORE THAN HALF THE DAYS
1. FEELING NERVOUS, ANXIOUS, OR ON EDGE: SEVERAL DAYS
IF YOU CHECKED OFF ANY PROBLEMS ON THIS QUESTIONNAIRE, HOW DIFFICULT HAVE THESE PROBLEMS MADE IT FOR YOU TO DO YOUR WORK, TAKE CARE OF THINGS AT HOME, OR GET ALONG WITH OTHER PEOPLE: SOMEWHAT DIFFICULT
3. WORRYING TOO MUCH ABOUT DIFFERENT THINGS: SEVERAL DAYS

## 2023-01-25 NOTE — PATIENT INSTRUCTIONS
Proceed with trial of Effexor for management of anxiety and depression.    Thyroid screening is pending given family history of thyroid cancer

## 2023-01-25 NOTE — LETTER
[facility]  01/25/23  Patient: Zee Miller  YOB: 1985  Medical Record Number: [id number]                                                                                  Non-Opioid Controlled Substance Agreement    This is an agreement between you and your provider regarding safe and appropriate use of controlled substances prescribed by your care team. Controlled substances are?medicines that can cause physical and mental dependence (abuse).     There are strict laws about having and using these medicines. We here at [facility] are  committed to working with you in your efforts to get better. To support you in this work, we'll help you schedule regular office appointments for medicine refills. If we must cancel or change your appointment for any reason, we'll make sure you have enough medicine to last until your next appointment.     As a Provider, I will:     Listen carefully to your concerns while treating you with respect.     Recommend a treatment plan that I believe is in your best interest and may involve therapies other than medicine.      Talk with you often about the possible benefits and the risk of harm of any medicine that we prescribe for you.    Assess the safety of this medicine and check how well it works.      Provide a plan on how to taper (discontinue or go off) using this medicine if the decision is made to stop its use.      ::  As a Patient, I understand controlled substances:       Are prescribed by my care provider to help me function or work and manage my condition(s).?    Are strong medicines and can cause serious side effects.       Need to be taken exactly as prescribed.?Combining controlled substances with certain medicines or chemicals (such as illegal drugs, alcohol, sedatives, sleeping pills, and benzodiazepines) can be dangerous or even fatal.? If I stop taking my medicines suddenly, I may have severe withdrawal symptoms.     The  risks, benefits, and side effects of these medicine(s) were explained to me. I agree that:    1. I will take part in other treatments as advised by my care team. This may be psychiatry or counseling, physical therapy, behavioral therapy, group treatment or a referral to specialist.    2. I will keep all my appointments and understand this is part of the monitoring of controlled substances.?My care team may require an office visit for EVERY controlled substance refill. If I miss appointments or don t follow instructions, my care team may stop my medicine    3. I will take my medicines as prescribed. I will not change the dose or schedule unless my care team tells me to. There will be no refills if I run out early.      4. I may be asked to come to the clinic and complete a urine drug test or complete a pill count. If I don t give a urine sample or participate in a pill count, the care team may stop my medicine.    5. I will only receive controlled substance prescriptions from this clinic. If I am treated by another provider, I will tell them that I am taking controlled substances and that I have a treatment agreement with this provider. I will inform my [facility] care team within one business day if I am given a prescription for any controlled substance by another healthcare provider. My [facility] care team can contact other providers and pharmacists about my use of any medicines.    6. It is up to me to make sure that I don't run out of my medicines on weekends or holidays.?If my care team is willing to refill my prescription without a visit, I must request refills only during office hours. Refills may take up to 3 business days to process. I will use one pharmacy to fill all my controlled substance prescriptions. I will notify the clinic about any changes to my insurance or medicine availability.    7. I am responsible for my prescriptions. If the medicine/prescription is lost, stolen or destroyed,  it will not be replaced.?I also agree not to share controlled substance medicines with anyone.     8. I am aware I should not use any illegal or recreational drugs. I agree not to drink alcohol unless my care team says I can.     9. If I enroll in the [place] Medical Cannabis program, I will tell my care team before my next refill.    10. I will tell my care team right away if I become pregnant, have a new medical problem treated outside of my regular clinic, or have a change in my medicines.     11. I understand that this medicine can affect my thinking, judgment and reaction time.? Alcohol and drugs affect the brain and body, which can affect the safety of my driving. Being under the influence of alcohol or drugs can affect my decision-making, behaviors, personal safety and the safety of others. Driving while impaired (DWI) can occur if a person is driving, operating or in physical control of a car, motorcycle, boat, snowmobile, ATV, motorbike, off-road vehicle or any other motor vehicle ([place] Statute 169A.20). I understand the risk if I choose to drive or operate any vehicle or machinery.    I understand that if I do not follow any of the conditions above, my prescriptions or treatment may be stopped or changed.   I agree that my provider, clinic care team and pharmacy may work with any city, state or federal law enforcement agency that investigates the misuse, sale or other diversion of my controlled medicine. I will allow my provider to discuss my care with, or share a copy of, this agreement with any other treating provider, pharmacy or emergency room where I receive care.     I have read this agreement and have asked questions about anything I did not understand.    ________________________________________________________  Patient Signature - Zee Miller     ___________________                   Date     ________________________________________________________  Provider Signature - Jayesh MONROE  Berumen, MD       ___________________                   Date     ________________________________________________________  Witness Signature (required if provider not present while patient signing)          ___________________                   Date

## 2023-01-25 NOTE — PROGRESS NOTES
Assessment & Plan     Anxiety and depression  At this time, patient does appear to be struggling with her anxiety and depression.  She is noted to have a PHQ-9 score of 11 with a DANILO-7 score of 9.  Patient does express a desire to try an alternative medication for ongoing management of her mood.  She will continue her propranolol 20 mg by mouth twice per day.  Given that she has been taking beta-blocker for over 1 year, she did submit a blood sample today for a basic metabolic panel.  Assuming no unexpected electrolyte or creatinine abnormalities, patient will continue her beta-blocker as currently prescribed.  We will also continue her as needed use of lorazepam 0.5 mg by mouth per day as needed for anxiety and/or panic attacks.  Side effects of benzodiazepine use were reviewed.  Patient did receive a prescription for 30 tablets with no refills.  She did sign a nonopioid controlled substance agreement today.  Patient was agreeable to proceed with a trial of venlafaxine given that she has not had good success with the use of SSRIs.  Patient will be placed on venlafaxine 37.5 mg by mouth per day for 1 week, and then her daily dose will be increased to 75 mg.  Side effects of medication were discussed.  Patient will follow-up in approximately 4 to 5 weeks to assess her response to the medication.  She is aware that she can contact the clinic sooner should she have any issues with the medication or worsening of her mood.  - propranolol (INDERAL) 20 MG tablet; Take 1 tablet (20 mg) by mouth 2 times daily  - LORazepam (ATIVAN) 0.5 MG tablet; Take 1 tablet (0.5 mg) by mouth daily as needed for anxiety (and panic attacks)  - venlafaxine (EFFEXOR XR) 37.5 MG 24 hr capsule; Take 1 capsule (37.5 mg) by mouth daily Take for one week then will increase to 75 mg daily.  - venlafaxine (EFFEXOR XR) 75 MG 24 hr capsule; Take 1 capsule (75 mg) by mouth daily    Family history of thyroid cancer  Patient will submit a blood sample  "today for thyroid screenings given her family history of thyroid cancer.  She will contact her with results once they are available for review.  - TSH with free T4 reflex; Future  - TSH with free T4 reflex    Ordering of each unique test  Prescription drug management  30 minutes spent on the date of the encounter doing chart review, history and exam, documentation and further activities per the note       BMI:   Estimated body mass index is 26 kg/m  as calculated from the following:    Height as of this encounter: 1.727 m (5' 8\").    Weight as of this encounter: 77.6 kg (171 lb).   Weight management plan: Discussed healthy diet and exercise guidelines    See Patient Instructions    Return in about 4 weeks (around 2/22/2023) for Follow up anxiety and depression.    Jayesh Berumen MD  [facility] ROMA Bullard is a 37 year old, presenting for the following health issues:  Recheck Medication      Patient is a 37-year-old  female who presents to the clinic to discuss medication for management of anxiety and depression.  Patient states that she has dealt with anxiety and depression for several years, but she had only been placed on medication in 2014.  Patient reports that she had previously been tried on sertraline, citalopram, and buspirone for management of her mood.  Unfortunately, none of these medications were effective, or she had issues with side effects.  Patient has been off of her regularly prescribed medications for management of her anxiety for approximately 4 years.  She has been taking propranolol 20 mg by mouth twice per day to prevent significant palpitations related to her anxiety.  She has been on this medication for several years.  Patient also has a prescription for lorazepam 0.5 mg to be taken by mouth as needed for anxiety attacks.  Patient's last prescription for this medication was in November 2021, and she did receive 30 tablets.  Patient states " that she has 1 tablet remaining.  Patient states that her anxiety and depression has gotten much more severe over the past several months.  It does seem to be triggered by having to put her pet cat down and follow-up 2022.  Patient reports increased anxiety, sleep difficulties, emotional ability, anhedonia, and depressed mood.  She denies any thoughts of self-harm or harm to others.  Patient states that at times she has had significant difficulties leaving her home due to high levels of anxiety.  Patient states that she did have to take a lorazepam prior to leaving her home to come into the clinic.  Patient is also interested in having thyroid lab work collected.  She does report that her mother and sister have both been diagnosed with thyroid cancer, and they did require surgical removal of the thyroid.  She does report that her sister is approximately 10 years older than her.  Patient does feel that she is losing some hair, but she does not report any other symptoms suggestive of.  She would like to have those lab test collected today if possible.    History of Present Illness       Reason for visit:  Med check- discuss thyroid    She eats 2-3 servings of fruits and vegetables daily.She consumes 0 sweetened beverage(s) daily.She exercises with enough effort to increase her heart rate 9 or less minutes per day.  She exercises with enough effort to increase her heart rate 3 or less days per week. She is missing 1 dose(s) of medications per week.  She is not taking prescribed medications regularly due to other.       Depression and Anxiety Follow-Up    How are you doing with your depression since your last visit? Worsened     How are you doing with your anxiety since your last visit?  Worsened     Are you having other symptoms that might be associated with depression or anxiety? Yes    Have you had a significant life event? No     Do you have any concerns with your use of alcohol or other drugs? No    Social History      Tobacco Use     Smoking status: Former     Types: Cigarettes     Quit date: 10/21/2019     Years since quitting: 3.2     Smokeless tobacco: Former   Substance Use Topics     Alcohol use: Yes     Drug use: Never     PHQ 11/17/2021   PHQ-9 Total Score 9   Q9: Thoughts of better off dead/self-harm past 2 weeks Not at all     DANILO-7 SCORE 11/17/2021   Total Score 4     Last PHQ-9 1/25/2023   1.  Little interest or pleasure in doing things 2   2.  Feeling down, depressed, or hopeless 2   3.  Trouble falling or staying asleep, or sleeping too much 2   4.  Feeling tired or having little energy 2   5.  Poor appetite or overeating 1   6.  Feeling bad about yourself 1   7.  Trouble concentrating 0   8.  Moving slowly or restless 1   Q9: Thoughts of better off dead/self-harm past 2 weeks 0   PHQ-9 Total Score 11   Difficulty at work, home, or with people Somewhat difficult     DANILO-7  1/25/2023   1. Feeling nervous, anxious, or on edge 1   2. Not being able to stop or control worrying 1   3. Worrying too much about different things 1   4. Trouble relaxing 1   5. Being so restless that it is hard to sit still 1   6. Becoming easily annoyed or irritable 2   7. Feeling afraid, as if something awful might happen 2   DANILO-7 Total Score 9   If you checked any problems, how difficult have they made it for you to do your work, take care of things at home, or get along with other people? Somewhat difficult             Review of Systems   Constitutional: Positive for activity change and fatigue. Negative for appetite change.   HENT: Negative.    Respiratory: Negative.    Cardiovascular: Negative.    Gastrointestinal: Negative.    Genitourinary: Negative.    Neurological: Negative.    Psychiatric/Behavioral: Positive for decreased concentration, dysphoric mood, mood changes and sleep disturbance. Negative for self-injury. The patient is nervous/anxious.             Objective    Blood pressure 124/83, pulse (!) 124, temperature 97.9  F  "(36.6  C), resp. rate 18, height 1.727 m (5' 8\"), weight 77.6 kg (171 lb), SpO2 100 %.      Physical Exam  Vitals reviewed.   HENT:      Head: Normocephalic and atraumatic.      Mouth/Throat:      Mouth: Mucous membranes are moist.      Pharynx: Oropharynx is clear.   Eyes:      Extraocular Movements: Extraocular movements intact.      Conjunctiva/sclera: Conjunctivae normal.      Pupils: Pupils are equal, round, and reactive to light.   Cardiovascular:      Rate and Rhythm: Regular rhythm. Tachycardia present.      Pulses: Normal pulses.      Heart sounds: Normal heart sounds.   Pulmonary:      Effort: Pulmonary effort is normal.      Breath sounds: Normal breath sounds.   Musculoskeletal:      Cervical back: Normal range of motion and neck supple.   Skin:     General: Skin is warm and dry.      Capillary Refill: Capillary refill takes less than 2 seconds.   Neurological:      General: No focal deficit present.      Mental Status: She is alert and oriented to person, place, and time.   Psychiatric:         Attention and Perception: Attention normal.         Mood and Affect: Mood is anxious.         Speech: Speech normal.         Behavior: Behavior normal.         Thought Content: Thought content normal.      Comments: PHQ-9 score is 11, and DANILO-7 score is 9.        Diagnostic Test Results: BMP and TSH with reflex freeT4 are pending.        "

## 2023-01-26 LAB
ANION GAP SERPL CALCULATED.3IONS-SCNC: 12 MMOL/L (ref 7–15)
BUN SERPL-MCNC: 15.1 MG/DL (ref 6–20)
CALCIUM SERPL-MCNC: 9.1 MG/DL (ref 8.6–10)
CHLORIDE SERPL-SCNC: 105 MMOL/L (ref 98–107)
CREAT SERPL-MCNC: 0.78 MG/DL (ref 0.51–0.95)
DEPRECATED HCO3 PLAS-SCNC: 21 MMOL/L (ref 22–29)
GFR SERPL CREATININE-BSD FRML MDRD: >90 ML/MIN/1.73M2
GLUCOSE SERPL-MCNC: 88 MG/DL (ref 70–99)
POTASSIUM SERPL-SCNC: 4.3 MMOL/L (ref 3.4–5.3)
SODIUM SERPL-SCNC: 138 MMOL/L (ref 136–145)
T4 FREE SERPL-MCNC: 0.91 NG/DL (ref 0.9–1.7)
TSH SERPL DL<=0.005 MIU/L-ACNC: 5.6 UIU/ML (ref 0.3–4.2)

## 2023-01-27 ENCOUNTER — MYC MEDICAL ADVICE (OUTPATIENT)
Dept: INTERNAL MEDICINE | Facility: CLINIC | Age: 38
End: 2023-01-27
Payer: COMMERCIAL

## 2023-01-27 DIAGNOSIS — Z80.8 FAMILY HISTORY OF THYROID CANCER: Primary | ICD-10-CM

## 2023-02-16 ASSESSMENT — PATIENT HEALTH QUESTIONNAIRE - PHQ9
SUM OF ALL RESPONSES TO PHQ QUESTIONS 1-9: 13
10. IF YOU CHECKED OFF ANY PROBLEMS, HOW DIFFICULT HAVE THESE PROBLEMS MADE IT FOR YOU TO DO YOUR WORK, TAKE CARE OF THINGS AT HOME, OR GET ALONG WITH OTHER PEOPLE: SOMEWHAT DIFFICULT
SUM OF ALL RESPONSES TO PHQ QUESTIONS 1-9: 13

## 2023-02-16 ASSESSMENT — ANXIETY QUESTIONNAIRES
7. FEELING AFRAID AS IF SOMETHING AWFUL MIGHT HAPPEN: SEVERAL DAYS
6. BECOMING EASILY ANNOYED OR IRRITABLE: SEVERAL DAYS
7. FEELING AFRAID AS IF SOMETHING AWFUL MIGHT HAPPEN: SEVERAL DAYS
GAD7 TOTAL SCORE: 10
4. TROUBLE RELAXING: SEVERAL DAYS
5. BEING SO RESTLESS THAT IT IS HARD TO SIT STILL: SEVERAL DAYS
2. NOT BEING ABLE TO STOP OR CONTROL WORRYING: MORE THAN HALF THE DAYS
IF YOU CHECKED OFF ANY PROBLEMS ON THIS QUESTIONNAIRE, HOW DIFFICULT HAVE THESE PROBLEMS MADE IT FOR YOU TO DO YOUR WORK, TAKE CARE OF THINGS AT HOME, OR GET ALONG WITH OTHER PEOPLE: SOMEWHAT DIFFICULT
3. WORRYING TOO MUCH ABOUT DIFFERENT THINGS: MORE THAN HALF THE DAYS
GAD7 TOTAL SCORE: 10
GAD7 TOTAL SCORE: 10
8. IF YOU CHECKED OFF ANY PROBLEMS, HOW DIFFICULT HAVE THESE MADE IT FOR YOU TO DO YOUR WORK, TAKE CARE OF THINGS AT HOME, OR GET ALONG WITH OTHER PEOPLE?: SOMEWHAT DIFFICULT
1. FEELING NERVOUS, ANXIOUS, OR ON EDGE: MORE THAN HALF THE DAYS

## 2023-02-23 ENCOUNTER — VIRTUAL VISIT (OUTPATIENT)
Dept: INTERNAL MEDICINE | Facility: CLINIC | Age: 38
End: 2023-02-23
Payer: COMMERCIAL

## 2023-02-23 DIAGNOSIS — F41.9 ANXIETY: Primary | ICD-10-CM

## 2023-02-23 DIAGNOSIS — F33.0 MILD RECURRENT MAJOR DEPRESSION (H): ICD-10-CM

## 2023-02-23 PROCEDURE — 96127 BRIEF EMOTIONAL/BEHAV ASSMT: CPT | Performed by: INTERNAL MEDICINE

## 2023-02-23 PROCEDURE — 99214 OFFICE O/P EST MOD 30 MIN: CPT | Mod: 93 | Performed by: INTERNAL MEDICINE

## 2023-02-23 RX ORDER — VENLAFAXINE HYDROCHLORIDE 150 MG/1
150 CAPSULE, EXTENDED RELEASE ORAL DAILY
Qty: 30 CAPSULE | Refills: 1 | Status: SHIPPED | OUTPATIENT
Start: 2023-02-23 | End: 2023-03-29

## 2023-02-23 ASSESSMENT — ENCOUNTER SYMPTOMS
GASTROINTESTINAL NEGATIVE: 1
SLEEP DISTURBANCE: 0
NERVOUS/ANXIOUS: 1
CONSTITUTIONAL NEGATIVE: 1
RESPIRATORY NEGATIVE: 1
DYSPHORIC MOOD: 1
CARDIOVASCULAR NEGATIVE: 1

## 2023-02-23 ASSESSMENT — PATIENT HEALTH QUESTIONNAIRE - PHQ9
10. IF YOU CHECKED OFF ANY PROBLEMS, HOW DIFFICULT HAVE THESE PROBLEMS MADE IT FOR YOU TO DO YOUR WORK, TAKE CARE OF THINGS AT HOME, OR GET ALONG WITH OTHER PEOPLE: SOMEWHAT DIFFICULT
SUM OF ALL RESPONSES TO PHQ QUESTIONS 1-9: 13

## 2023-02-23 ASSESSMENT — ANXIETY QUESTIONNAIRES: GAD7 TOTAL SCORE: 10

## 2023-02-23 NOTE — PATIENT INSTRUCTIONS
Will increase Effexor to 150 mg daily to see if this will be more effective for management of her anxiety and depression.

## 2023-02-23 NOTE — PROGRESS NOTES
Aleah is a 37 year old who is being evaluated via a billable telephone visit.      What phone number would you like to be contacted at? [phone]  How would you like to obtain your AVS? Jose  Distant Location (provider location):  On-site    Assessment & Plan     Anxiety and mild recurrent major depression (H)  At this time, patient does not appear to have made any substantial improvement in her anxiety and depression.  Her PHQ-9 score is noted to be 13, and her DANILO-7 score is 10.  This would indicate that her anxiety and depression are not currently under good control.  After much discussion, patient did agree to proceed with an upward titration of her Effexor dosage.  Patient will be placed on Effexor 150 mg by mouth per day for the next 1 month to see if this will provide her with better control of her anxiety and depression.  Side effects of the medication were reviewed.  Patient is aware that she can contact the clinic should she have any issues with worsening mood or the medication adjustment.  Patient can continue her as needed use of lorazepam for breakthrough anxiety.  - venlafaxine (EFFEXOR XR) 150 MG 24 hr capsule; Take 1 capsule (150 mg) by mouth daily    Prescription drug management  25 minutes spent on the date of the encounter doing chart review, history and exam, documentation and further activities per the note       See Patient Instructions    Return in about 4 weeks (around 3/23/2023) for Follow up anxiety and depression..    Jayesh Berumen MD  [facility]    Barrett Bullard is a 37 year old presenting for the following health issues:  RECHECK (Follow up depression/anxiety)      Patient is a 37-year-old female who participates in a virtual visit for follow-up of her anxiety and depression. Patient was seen in the clinic approximately 1 month ago for a discussion of her anxiety and depression.  She had been having some issues with increased anxiety, sleep  difficulties, emotional ability, anhedonia, and a depressed mood.  Her anxiety has been so severe that she had some difficulties with leaving her home.  Patient was placed on Effexor and titrated up to 75 mg daily for management of her mood.  Unfortunately, she has not noted any improvement in her anxiety or her depression since being placed on this medication.  She continues to struggle with the previously mentioned symptoms.  Patient does have a prescription for lorazepam to use on an as-needed basis for anxiety.  Fortunately, she has only had to use it 1 time since her last visit.  Patient states that she does make every effort to try to avoid using that particular medication.  She is wondering if her medication dosage should be adjusted.    History of Present Illness       Mental Health Follow-up:  Patient presents to follow-up on Depression & Anxiety.Patient's depression since last visit has been:  Medium  The patient is not having other symptoms associated with depression.  Patient's anxiety since last visit has been:  No change  The patient is not having other symptoms associated with anxiety.  Any significant life events: No  Patient is feeling anxious or having panic attacks.  Patient has no concerns about alcohol or drug use.    She eats 2-3 servings of fruits and vegetables daily.She consumes 1 sweetened beverage(s) daily.She exercises with enough effort to increase her heart rate 9 or less minutes per day.  She exercises with enough effort to increase her heart rate 3 or less days per week. She is missing 1 dose(s) of medications per week.  She is not taking prescribed medications regularly due to remembering to take.    Today's PHQ-9         PHQ-9 Total Score: 13    PHQ-9 Q9 Thoughts of better off dead/self-harm past 2 weeks :   Not at all    How difficult have these problems made it for you to do your work, take care of things at home, or get along with other people: Somewhat difficult  Today's DANILO-7  Score: 10             Review of Systems   Constitutional: Negative.    HENT: Negative.    Respiratory: Negative.    Cardiovascular: Negative.    Gastrointestinal: Negative.    Psychiatric/Behavioral: Positive for dysphoric mood and mood changes. Negative for self-injury and sleep disturbance. The patient is nervous/anxious.             Objective           Vitals:  No vitals were obtained today due to virtual visit.    Physical Exam   healthy, alert and no distress  PSYCH: Alert and oriented times 3; coherent speech, normal   rate and volume, able to articulate logical thoughts, able   to abstract reason, no tangential thoughts, no hallucinations   or delusions.  PHQ-9 score is 13, and her DANILO-7 score is 10.  RESP: No cough, no audible wheezing, able to talk in full sentences  Remainder of exam unable to be completed due to telephone visits                Phone call duration: 25 minutes

## 2023-03-01 ENCOUNTER — HOSPITAL ENCOUNTER (OUTPATIENT)
Dept: ULTRASOUND IMAGING | Facility: CLINIC | Age: 38
Discharge: HOME | End: 2023-03-01
Attending: INTERNAL MEDICINE | Admitting: INTERNAL MEDICINE
Payer: COMMERCIAL

## 2023-03-01 DIAGNOSIS — Z80.8 FAMILY HISTORY OF THYROID CANCER: ICD-10-CM

## 2023-03-01 PROCEDURE — 76536 US EXAM OF HEAD AND NECK: CPT

## 2023-03-23 ASSESSMENT — ANXIETY QUESTIONNAIRES
IF YOU CHECKED OFF ANY PROBLEMS ON THIS QUESTIONNAIRE, HOW DIFFICULT HAVE THESE PROBLEMS MADE IT FOR YOU TO DO YOUR WORK, TAKE CARE OF THINGS AT HOME, OR GET ALONG WITH OTHER PEOPLE: SOMEWHAT DIFFICULT
GAD7 TOTAL SCORE: 9
3. WORRYING TOO MUCH ABOUT DIFFERENT THINGS: SEVERAL DAYS
GAD7 TOTAL SCORE: 9
1. FEELING NERVOUS, ANXIOUS, OR ON EDGE: NEARLY EVERY DAY
4. TROUBLE RELAXING: SEVERAL DAYS
2. NOT BEING ABLE TO STOP OR CONTROL WORRYING: MORE THAN HALF THE DAYS
7. FEELING AFRAID AS IF SOMETHING AWFUL MIGHT HAPPEN: SEVERAL DAYS
7. FEELING AFRAID AS IF SOMETHING AWFUL MIGHT HAPPEN: SEVERAL DAYS
6. BECOMING EASILY ANNOYED OR IRRITABLE: SEVERAL DAYS
GAD7 TOTAL SCORE: 9
5. BEING SO RESTLESS THAT IT IS HARD TO SIT STILL: NOT AT ALL
8. IF YOU CHECKED OFF ANY PROBLEMS, HOW DIFFICULT HAVE THESE MADE IT FOR YOU TO DO YOUR WORK, TAKE CARE OF THINGS AT HOME, OR GET ALONG WITH OTHER PEOPLE?: SOMEWHAT DIFFICULT

## 2023-03-23 ASSESSMENT — PATIENT HEALTH QUESTIONNAIRE - PHQ9
SUM OF ALL RESPONSES TO PHQ QUESTIONS 1-9: 15
10. IF YOU CHECKED OFF ANY PROBLEMS, HOW DIFFICULT HAVE THESE PROBLEMS MADE IT FOR YOU TO DO YOUR WORK, TAKE CARE OF THINGS AT HOME, OR GET ALONG WITH OTHER PEOPLE: SOMEWHAT DIFFICULT
SUM OF ALL RESPONSES TO PHQ QUESTIONS 1-9: 15

## 2023-03-29 ENCOUNTER — VIRTUAL VISIT (OUTPATIENT)
Dept: INTERNAL MEDICINE | Facility: CLINIC | Age: 38
End: 2023-03-29
Payer: COMMERCIAL

## 2023-03-29 DIAGNOSIS — F41.9 ANXIETY: ICD-10-CM

## 2023-03-29 DIAGNOSIS — F33.0 MILD RECURRENT MAJOR DEPRESSION (H): Primary | ICD-10-CM

## 2023-03-29 DIAGNOSIS — E04.1 THYROID NODULE: ICD-10-CM

## 2023-03-29 PROCEDURE — 99214 OFFICE O/P EST MOD 30 MIN: CPT | Mod: 93 | Performed by: INTERNAL MEDICINE

## 2023-03-29 PROCEDURE — 96127 BRIEF EMOTIONAL/BEHAV ASSMT: CPT | Performed by: INTERNAL MEDICINE

## 2023-03-29 RX ORDER — VENLAFAXINE HYDROCHLORIDE 150 MG/1
150 CAPSULE, EXTENDED RELEASE ORAL DAILY
Qty: 30 CAPSULE | Refills: 1 | Status: SHIPPED | OUTPATIENT
Start: 2023-03-29 | End: 2023-06-02

## 2023-03-29 RX ORDER — VENLAFAXINE HYDROCHLORIDE 75 MG/1
75 CAPSULE, EXTENDED RELEASE ORAL DAILY
Qty: 30 CAPSULE | Refills: 1 | Status: SHIPPED | OUTPATIENT
Start: 2023-03-29 | End: 2023-06-02

## 2023-03-29 ASSESSMENT — ENCOUNTER SYMPTOMS
MUSCULOSKELETAL NEGATIVE: 1
CARDIOVASCULAR NEGATIVE: 1
CONSTITUTIONAL NEGATIVE: 1
NERVOUS/ANXIOUS: 1
GASTROINTESTINAL NEGATIVE: 1
RESPIRATORY NEGATIVE: 1

## 2023-03-29 ASSESSMENT — PATIENT HEALTH QUESTIONNAIRE - PHQ9
SUM OF ALL RESPONSES TO PHQ QUESTIONS 1-9: 15
10. IF YOU CHECKED OFF ANY PROBLEMS, HOW DIFFICULT HAVE THESE PROBLEMS MADE IT FOR YOU TO DO YOUR WORK, TAKE CARE OF THINGS AT HOME, OR GET ALONG WITH OTHER PEOPLE: SOMEWHAT DIFFICULT

## 2023-03-29 ASSESSMENT — ANXIETY QUESTIONNAIRES: GAD7 TOTAL SCORE: 9

## 2023-03-29 NOTE — PATIENT INSTRUCTIONS
Will increase venlafaxine to 225 mg daily for anxiety and depression.    Follow up thyroid labs have been ordered.

## 2023-03-29 NOTE — PROGRESS NOTES
Aleah is a 37 year old who is being evaluated via a billable telephone visit.      What phone number would you like to be contacted at? [phone]  How would you like to obtain your AVS? Jose    Distant Location (provider location):  On-site    Assessment & Plan     Mild recurrent major depression (H) and anxiety  At this time, patient is noted to have a PHQ-9 score of 15 with a DANILO-7 score of 9.  This would indicate that her anxiety and depression are not currently under good control.  Patient reports that she has not had a substantial improvement in her overall mood on her current dose of Effexor.  After much discussion, patient did wish to continue with the Effexor for the time being as she was concerned about worsening of her mood during the transition.  To a new medication.  Patient does have an upcoming trip at the end of April that she does not want to be affected by her anxiety and depression.  After much discussion, we did elect to increase her total daily dose of Effexor to 225 mg.  Side effects of medication were reviewed.  We will reassess her response to this medication after she returns from her trip in early May.  - venlafaxine (EFFEXOR XR) 75 MG 24 hr capsule; Take 1 capsule (75 mg) by mouth daily With 150 mg capsule for total daily dose of 225 mg.  - venlafaxine (EFFEXOR XR) 150 MG 24 hr capsule; Take 1 capsule (150 mg) by mouth daily With 75 mg capsule for total daily dose of 225 mg.    Thyroid nodule  Patient was very concerned about waiting for 1 year for follow-up ultrasound of her thyroid to reassess the nodule that was previously noted.  We did elect to have repeat thyroid studies collected in 2 months as that will be approximately 6 months from her previous lab collection.  Lab orders have been placed.  Patient will be contacted with the results once they are available for review.  - TSH with free T4 reflex; Future    Ordering of each unique test  Prescription drug management  22 minutes  spent by me on the date of the encounter doing chart review, history and exam, documentation and further activities per the note       See Patient Instructions    Jayesh Berumen MD  [facility] ROMA Bullard is a 37 year old, presenting for the following health issues:  No chief complaint on file.  No flowsheet data found.  Patient is a 37-year-old female who participates in a virtual visit for follow-up of her medication.  Patient has a history of anxiety and depression, and she had been transitioned to venlafaxine in January 2023.  She was last seen approximately 1 month ago in February where her dosage of venlafaxine was increased to 150 mg daily.  Unfortunately, patient has not had any substantial improvement in her overall mood since being placed on her increased dose of medication.  She still continues to struggle with increased anxiety and depressed mood.  Patient is wondering if her dosage should be adjusted at this time.  She is reluctant to transition to a new medication as she does have an upcoming trip at the end of April that she does not want to be complicated by any major changes in her medication regimen.  Patient also has a family history of thyroid cancer, and she did undergo a thyroid ultrasound 2 months ago.  An 11 mm nodule was noted in the left thyroid lobe, and it was recommended that she have a repeat ultrasound in 1 year.  Patient is concerned about this nodule, and she is wondering if she should have repeat thyroid studies performed before 1 year.  Her last thyroid studies in January were notable for a TSH of 5.6 with a subsequent free T4 level of 0.91.    History of Present Illness       Mental Health Follow-up:  Patient presents to follow-up on Depression & Anxiety.Patient's depression since last visit has been:  Worse  The patient is having other symptoms associated with depression.  Patient's anxiety since last visit has been:  Worse  The patient  is not having other symptoms associated with anxiety.  Any significant life events: health concerns and other  Patient is feeling anxious or having panic attacks.  Patient has no concerns about alcohol or drug use.    Reason for visit:  New medication does not seem to be working. Worrying about upcoming vacation out of the country and questions about thyroid.    She eats 2-3 servings of fruits and vegetables daily.She consumes 1 sweetened beverage(s) daily.She exercises with enough effort to increase her heart rate 9 or less minutes per day.  She exercises with enough effort to increase her heart rate 3 or less days per week. She is missing 1 dose(s) of medications per week.  She is not taking prescribed medications regularly due to remembering to take.    Today's PHQ-9         PHQ-9 Total Score: 15    PHQ-9 Q9 Thoughts of better off dead/self-harm past 2 weeks :   Not at all    How difficult have these problems made it for you to do your work, take care of things at home, or get along with other people: Somewhat difficult  Today's DANILO-7 Score: 9       Depression and Anxiety Follow-Up    How are you doing with your depression since your last visit? Worsened     How are you doing with your anxiety since your last visit?  No change    Are you having other symptoms that might be associated with depression or anxiety? Yes:  digestive issues    Have you had a significant life event? No     Do you have any concerns with your use of alcohol or other drugs? No    Social History     Tobacco Use     Smoking status: Former     Types: Cigarettes     Quit date: 10/21/2019     Years since quitting: 3.4     Smokeless tobacco: Former   Vaping Use     Vaping Use: Never used   Substance Use Topics     Alcohol use: Yes     Drug use: Never     PHQ 1/25/2023 2/16/2023 3/23/2023   PHQ-9 Total Score 11 13 15   Q9: Thoughts of better off dead/self-harm past 2 weeks Not at all Not at all Not at all     DANILO-7 SCORE 1/25/2023 2/16/2023  3/23/2023   Total Score - 10 (moderate anxiety) 9 (mild anxiety)   Total Score 9 10 9     Last PHQ-9 3/23/2023   1.  Little interest or pleasure in doing things 2   2.  Feeling down, depressed, or hopeless 2   3.  Trouble falling or staying asleep, or sleeping too much 3   4.  Feeling tired or having little energy 3   5.  Poor appetite or overeating 2   6.  Feeling bad about yourself 1   7.  Trouble concentrating 2   8.  Moving slowly or restless 0   Q9: Thoughts of better off dead/self-harm past 2 weeks 0   PHQ-9 Total Score 15   Difficulty at work, home, or with people -     DANILO-7  3/23/2023   1. Feeling nervous, anxious, or on edge 3   2. Not being able to stop or control worrying 2   3. Worrying too much about different things 1   4. Trouble relaxing 1   5. Being so restless that it is hard to sit still 0   6. Becoming easily annoyed or irritable 1   7. Feeling afraid, as if something awful might happen 1   DANILO-7 Total Score 9   If you checked any problems, how difficult have they made it for you to do your work, take care of things at home, or get along with other people? Somewhat difficult           Review of Systems   Constitutional: Negative.    HENT: Negative.    Respiratory: Negative.    Cardiovascular: Negative.    Gastrointestinal: Negative.    Genitourinary: Negative.    Musculoskeletal: Negative.    Psychiatric/Behavioral: Positive for mood changes. The patient is nervous/anxious.             Objective           Vitals:  No vitals were obtained today due to virtual visit.    Physical Exam   healthy, alert and no distress  PSYCH: Alert and oriented times 3; coherent speech, normal   rate and volume, able to articulate logical thoughts, able   to abstract reason, no tangential thoughts, no hallucinations   or delusions  Her affect is normal.  PHQ-9 score is 15, and DANILO-7 score is 9.  RESP: No cough, no audible wheezing, able to talk in full sentences  Remainder of exam unable to be completed due to  telephone visits                Phone call duration: 22 minutes

## 2023-05-05 ENCOUNTER — OFFICE VISIT (OUTPATIENT)
Dept: FAMILY MEDICINE | Facility: CLINIC | Age: 38
End: 2023-05-05
Payer: COMMERCIAL

## 2023-05-05 VITALS
HEART RATE: 89 BPM | SYSTOLIC BLOOD PRESSURE: 127 MMHG | BODY MASS INDEX: 24.86 KG/M2 | WEIGHT: 164 LBS | DIASTOLIC BLOOD PRESSURE: 84 MMHG | RESPIRATION RATE: 16 BRPM | HEIGHT: 68 IN | TEMPERATURE: 98.3 F | OXYGEN SATURATION: 98 %

## 2023-05-05 DIAGNOSIS — H91.8X1 OTHER HEARING LOSS OF RIGHT EAR WITH UNRESTRICTED HEARING OF LEFT EAR: Primary | ICD-10-CM

## 2023-05-05 DIAGNOSIS — H65.01 NON-RECURRENT ACUTE SEROUS OTITIS MEDIA OF RIGHT EAR: ICD-10-CM

## 2023-05-05 PROCEDURE — 99213 OFFICE O/P EST LOW 20 MIN: CPT | Performed by: FAMILY MEDICINE

## 2023-05-05 RX ORDER — FLUTICASONE PROPIONATE 50 MCG
1 SPRAY, SUSPENSION (ML) NASAL DAILY
Qty: 9.9 ML | Refills: 0 | Status: SHIPPED | OUTPATIENT
Start: 2023-05-05 | End: 2023-06-30

## 2023-05-05 ASSESSMENT — PATIENT HEALTH QUESTIONNAIRE - PHQ9
SUM OF ALL RESPONSES TO PHQ QUESTIONS 1-9: 14
SUM OF ALL RESPONSES TO PHQ QUESTIONS 1-9: 14

## 2023-05-05 NOTE — PROGRESS NOTES
Assessment & Plan     Other hearing loss of right ear with unrestricted hearing of left ear  TM intact.  Recommend starting flonase and refer to ENT.  - Adult ENT  Referral; Future  - fluticasone (FLONASE) 50 MCG/ACT nasal spray; Spray 1 spray into both nostrils daily    Non-recurrent acute serous otitis media of right ear  As above  - Adult ENT  Referral; Future  - fluticasone (FLONASE) 50 MCG/ACT nasal spray; Spray 1 spray into both nostrils daily    16 minutes spent by me on the date of the encounter doing chart review, history and exam, documentation and further activities per the note       See Patient Instructions    Apoorva Fernandez MD  [facility] BRI Bullard is a 37 year old, presenting for the following health issues:  Ear Problem        5/5/2023     3:38 PM   Additional Questions   Roomed by Yarelis Posada CMA     History of Present Illness       Reason for visit:  Possible perforated eardrum and vertigo  Symptom onset:  3-7 days ago  Symptoms include:  Hearing loss, drainage and some pain. Twitching of eyes, head and body. Extreme fatigue, sweaty, nausea and panic  Symptom intensity:  Severe  Symptom progression:  Worsening  Had these symptoms before:  No  What makes it worse:  The symptoms of vertigo started 2 days ago  What makes it better:  Sleeping all day    She eats 2-3 servings of fruits and vegetables daily.She consumes 0 sweetened beverage(s) daily.She exercises with enough effort to increase her heart rate 9 or less minutes per day.  She exercises with enough effort to increase her heart rate 3 or less days per week. She is missing 1 dose(s) of medications per week.  She is not taking prescribed medications regularly due to remembering to take.    Today's PHQ-9         PHQ-9 Total Score: 14    PHQ-9 Q9 Thoughts of better off dead/self-harm past 2 weeks :   Not at all           She was scuba diving recently, had trouble with ear pain when  "she was descending. She went up, then back down and up, and back down several times to acclimate to the pressure.  She estimates being 20 feet below surface when she heard a pop, does not recall if she had pain in the ear, was more panicky about it happening when she was below water. She reports drainage out of both ears.  Hearing loss in right ear about 50%, left ear hearing is fine.  Occasional pain in both ears.  Clear fluid coming from the right ear, draining since Sunday.  Left ear is still draining a little bit of fluid.  No fevers, some chills.  She has vertigo, dizziness, eyes feel like they are jumping.  Had a panic attack this morning, used Lorazepam.        Review of Systems   Constitutional, HEENT, cardiovascular, pulmonary, gi and gu systems are negative, except as otherwise noted.      Objective    /84 (BP Location: Right arm, Patient Position: Sitting, Cuff Size: Adult Regular)   Pulse 89   Temp 98.3  F (36.8  C) (Oral)   Resp 16   Ht 1.727 m (5' 8\")   Wt 74.4 kg (164 lb)   SpO2 98%   BMI 24.94 kg/m    Body mass index is 24.94 kg/m .  Physical Exam   GENERAL: healthy, alert and no distress  HENT: normal cephalic/atraumatic, right ear: clear effusion, left ear: normal: no effusions, no erythema, normal landmarks and nasal mucosa edematous                 "

## 2023-05-31 DIAGNOSIS — F33.0 MILD RECURRENT MAJOR DEPRESSION (H): ICD-10-CM

## 2023-05-31 DIAGNOSIS — F41.9 ANXIETY: ICD-10-CM

## 2023-06-02 RX ORDER — VENLAFAXINE HYDROCHLORIDE 75 MG/1
75 CAPSULE, EXTENDED RELEASE ORAL DAILY
Qty: 30 CAPSULE | Refills: 1 | Status: SHIPPED | OUTPATIENT
Start: 2023-06-02 | End: 2023-10-06

## 2023-06-02 RX ORDER — VENLAFAXINE HYDROCHLORIDE 150 MG/1
150 CAPSULE, EXTENDED RELEASE ORAL DAILY
Qty: 30 CAPSULE | Refills: 1 | Status: SHIPPED | OUTPATIENT
Start: 2023-06-02 | End: 2023-10-06

## 2023-06-02 NOTE — TELEPHONE ENCOUNTER
Routing refill request to provider for review/approval because:  PHQ-9 score:      5/5/2023     9:23 AM   PHQ   PHQ-9 Total Score 14   Q9: Thoughts of better off dead/self-harm past 2 weeks Not at all

## 2023-06-04 ENCOUNTER — HEALTH MAINTENANCE LETTER (OUTPATIENT)
Age: 38
End: 2023-06-04

## 2023-06-30 DIAGNOSIS — H91.8X1 OTHER HEARING LOSS OF RIGHT EAR WITH UNRESTRICTED HEARING OF LEFT EAR: ICD-10-CM

## 2023-06-30 DIAGNOSIS — H65.01 NON-RECURRENT ACUTE SEROUS OTITIS MEDIA OF RIGHT EAR: ICD-10-CM

## 2023-06-30 RX ORDER — FLUTICASONE PROPIONATE 50 MCG
SPRAY, SUSPENSION (ML) NASAL
Qty: 16 ML | Refills: 0 | Status: SHIPPED | OUTPATIENT
Start: 2023-06-30

## 2023-06-30 NOTE — TELEPHONE ENCOUNTER
Ongoing RX?  Venita Burgess, RN      5/5/2023  [facility]    Non-recurrent acute serous otitis media of right ear  As above  - Adult ENT  Referral; Future  - fluticasone (FLONASE) 50 MCG/ACT nasal spray; Spray 1 spray into both nostrils daily      April RACHEL Fernandez MD  [facility]

## 2023-08-21 ENCOUNTER — MYC MEDICAL ADVICE (OUTPATIENT)
Dept: INTERNAL MEDICINE | Facility: CLINIC | Age: 38
End: 2023-08-21
Payer: COMMERCIAL

## 2023-08-21 DIAGNOSIS — F41.9 ANXIETY: Primary | ICD-10-CM

## 2023-08-23 NOTE — TELEPHONE ENCOUNTER
The next step down off of effexor would be a dose of 37.5. I can send prescription for patient if she would like.

## 2023-08-24 RX ORDER — VENLAFAXINE HYDROCHLORIDE 37.5 MG/1
37.5 CAPSULE, EXTENDED RELEASE ORAL DAILY
Qty: 30 CAPSULE | Refills: 3 | Status: SHIPPED | OUTPATIENT
Start: 2023-08-24 | End: 2023-10-06

## 2023-09-13 ENCOUNTER — MYC MEDICAL ADVICE (OUTPATIENT)
Dept: INTERNAL MEDICINE | Facility: CLINIC | Age: 38
End: 2023-09-13
Payer: COMMERCIAL

## 2023-09-13 NOTE — TELEPHONE ENCOUNTER
Sent [employer] message to patient.    Rolando Romero, Triage RN [place]  9:06 AM 9/13/2023

## 2023-10-06 ENCOUNTER — VIRTUAL VISIT (OUTPATIENT)
Dept: INTERNAL MEDICINE | Facility: CLINIC | Age: 38
End: 2023-10-06
Payer: COMMERCIAL

## 2023-10-06 DIAGNOSIS — F41.9 ANXIETY: ICD-10-CM

## 2023-10-06 DIAGNOSIS — F33.0 MILD RECURRENT MAJOR DEPRESSION (H): ICD-10-CM

## 2023-10-06 DIAGNOSIS — F41.9 ANXIETY: Primary | ICD-10-CM

## 2023-10-06 PROCEDURE — 96127 BRIEF EMOTIONAL/BEHAV ASSMT: CPT | Mod: VID | Performed by: INTERNAL MEDICINE

## 2023-10-06 PROCEDURE — 99214 OFFICE O/P EST MOD 30 MIN: CPT | Mod: VID | Performed by: INTERNAL MEDICINE

## 2023-10-06 RX ORDER — FLUOXETINE 10 MG/1
10 TABLET, FILM COATED ORAL DAILY
Qty: 30 TABLET | Refills: 1 | Status: SHIPPED | OUTPATIENT
Start: 2023-10-06 | End: 2023-10-09

## 2023-10-06 ASSESSMENT — PATIENT HEALTH QUESTIONNAIRE - PHQ9
10. IF YOU CHECKED OFF ANY PROBLEMS, HOW DIFFICULT HAVE THESE PROBLEMS MADE IT FOR YOU TO DO YOUR WORK, TAKE CARE OF THINGS AT HOME, OR GET ALONG WITH OTHER PEOPLE: EXTREMELY DIFFICULT
SUM OF ALL RESPONSES TO PHQ QUESTIONS 1-9: 19
SUM OF ALL RESPONSES TO PHQ QUESTIONS 1-9: 19

## 2023-10-06 ASSESSMENT — ANXIETY QUESTIONNAIRES
GAD7 TOTAL SCORE: 9
4. TROUBLE RELAXING: MORE THAN HALF THE DAYS
5. BEING SO RESTLESS THAT IT IS HARD TO SIT STILL: SEVERAL DAYS
GAD7 TOTAL SCORE: 9
6. BECOMING EASILY ANNOYED OR IRRITABLE: NEARLY EVERY DAY
2. NOT BEING ABLE TO STOP OR CONTROL WORRYING: SEVERAL DAYS
3. WORRYING TOO MUCH ABOUT DIFFERENT THINGS: SEVERAL DAYS
IF YOU CHECKED OFF ANY PROBLEMS ON THIS QUESTIONNAIRE, HOW DIFFICULT HAVE THESE PROBLEMS MADE IT FOR YOU TO DO YOUR WORK, TAKE CARE OF THINGS AT HOME, OR GET ALONG WITH OTHER PEOPLE: SOMEWHAT DIFFICULT
1. FEELING NERVOUS, ANXIOUS, OR ON EDGE: SEVERAL DAYS
7. FEELING AFRAID AS IF SOMETHING AWFUL MIGHT HAPPEN: NOT AT ALL

## 2023-10-06 ASSESSMENT — ENCOUNTER SYMPTOMS
RESPIRATORY NEGATIVE: 1
MUSCULOSKELETAL NEGATIVE: 1
CARDIOVASCULAR NEGATIVE: 1
DECREASED CONCENTRATION: 1
GASTROINTESTINAL NEGATIVE: 1
SLEEP DISTURBANCE: 1
CONSTITUTIONAL NEGATIVE: 1

## 2023-10-06 NOTE — PROGRESS NOTES
Aleah is a 38 year old who is being evaluated via a billable video visit.      How would you like to obtain your AVS? MyChart  If the video visit is dropped, the invitation should be resent by: Text to cell phone: [phone]  Will anyone else be joining your video visit? No      Assessment & Plan     Anxiety and Mild recurrent major depression (H24)  At this time, patient is noted to have a PHQ-9 score of 19, and she did not complete a DANILO-7 score sheet today.  This would indicate that her issues with her mood are not currently under very good control.  After much discussion, we did elect to proceed with a trial of fluoxetine 10 mg by mouth per day for ongoing management of her anxiety and depression.  Patient has already weaned herself off of Effexor.  She reports that she has been off of that medication for approximately 5 days without issue.  Side effects of SSRI use were reviewed.  Patient will follow-up in approximately 4 to 6 weeks for repeat assessment.  She is aware that should she have any issues with the medication or worsening of her mood that she can contact the clinic for additional assistance.  - FLUoxetine (PROZAC) 10 MG tablet; Take 1 tablet (10 mg) by mouth daily    Prescription drug management  22 minutes spent by me on the date of the encounter doing chart review, history and exam, documentation and further activities per the note      See Patient Instructions    Jayesh Berumen MD  [facility]    Barrett Bullard is a 38 year old, presenting for the following health issues:  Recheck Medication      Patient is a 38-year-old  female who participates in a virtual visit to discuss medications for anxiety and depression.  Patient had previously been taking Effexor for management of her mood.  Her dosage had been titrated up to 225 mg daily, but it did not provide her with any improvement in her mood.  She reports she continues to struggle with depressed  mood, anhedonia, lack of motivation, and increased worrying.  Patient has previously been tried on sertraline and Lexapro over the years.  Unfortunately, she has not found a medication that has been helpful for management of her mood.  She does keep a small supply of lorazepam 0.5 mg tablets with instruction take 1 tab by mouth per day as needed for anxiety or panic attacks.  Patient did receive a refill of this prescription in January 2023 for 30 tablets, and she still has approximately 6 to 7 tablets remaining.  He is wondering what other options for treatment might be available to her.  She did express some interest in proceeding with a trial of fluoxetine as she has had several friends who have done well with that particular medication.    History of Present Illness       Mental Health Follow-up:  Patient presents to follow-up on Depression & Anxiety.Patient's depression since last visit has been:  Worse  The patient is having other symptoms associated with depression.  Patient's anxiety since last visit has been:  Medium  The patient is having other symptoms associated with anxiety.  Any significant life events: No  Patient is feeling anxious or having panic attacks.  Patient has no concerns about alcohol or drug use.    She eats 2-3 servings of fruits and vegetables daily.She consumes 0 sweetened beverage(s) daily.She exercises with enough effort to increase her heart rate 9 or less minutes per day.  She exercises with enough effort to increase her heart rate 3 or less days per week. She is missing 1 dose(s) of medications per week.  She is not taking prescribed medications regularly due to remembering to take.       Review of Systems   Constitutional: Negative.    HENT: Negative.     Respiratory: Negative.     Cardiovascular: Negative.    Gastrointestinal: Negative.    Musculoskeletal: Negative.    Psychiatric/Behavioral:  Positive for decreased concentration, mood changes and sleep disturbance.            Objective       Vitals:  No vitals were obtained today due to virtual visit.    Physical Exam   GENERAL: Healthy, alert and no distress  EYES: Eyes grossly normal to inspection.  No discharge or erythema, or obvious scleral/conjunctival abnormalities.  RESP: No audible wheeze, cough, or visible cyanosis.  No visible retractions or increased work of breathing.    SKIN: Visible skin clear. No significant rash, abnormal pigmentation or lesions.  NEURO: Cranial nerves grossly intact.  Mentation and speech appropriate for age.  PSYCH: Mentation appears normal, affect normal/bright, judgement and insight intact, normal speech and appearance well-groomed.  PHQ-9 score of 19.        Video-Visit Details    Type of service:  Video Visit     Originating Location (pt. Location): Home  Distant Location (provider location):  On-site  Platform used for Video Visit: [employer]

## 2023-10-09 ENCOUNTER — MYC MEDICAL ADVICE (OUTPATIENT)
Dept: INTERNAL MEDICINE | Facility: CLINIC | Age: 38
End: 2023-10-09
Payer: COMMERCIAL

## 2023-10-09 DIAGNOSIS — F33.0 MILD RECURRENT MAJOR DEPRESSION (H): ICD-10-CM

## 2023-10-09 DIAGNOSIS — F41.9 ANXIETY: ICD-10-CM

## 2023-10-09 RX ORDER — FLUOXETINE 10 MG/1
10 TABLET, FILM COATED ORAL DAILY
Qty: 30 TABLET | Refills: 1 | OUTPATIENT
Start: 2023-10-09

## 2023-10-09 RX ORDER — FLUOXETINE 10 MG/1
10 TABLET, FILM COATED ORAL DAILY
Qty: 30 TABLET | Refills: 1 | Status: SHIPPED | OUTPATIENT
Start: 2023-10-09 | End: 2023-11-17

## 2023-10-20 ENCOUNTER — MYC MEDICAL ADVICE (OUTPATIENT)
Dept: INTERNAL MEDICINE | Facility: CLINIC | Age: 38
End: 2023-10-20
Payer: COMMERCIAL

## 2023-10-20 NOTE — TELEPHONE ENCOUNTER
Please see patient's mychart message below.      Please advise, thanks.    Rolando Romero, Triage RN [place]  3:50 PM 10/20/2023

## 2023-10-26 NOTE — TELEPHONE ENCOUNTER
Please see patient's most recent [employer] message below with forms attached.    Forms placed in primary care provider's basket.    Please advise, thanks.

## 2023-11-02 ASSESSMENT — ANXIETY QUESTIONNAIRES
GAD7 TOTAL SCORE: 18
7. FEELING AFRAID AS IF SOMETHING AWFUL MIGHT HAPPEN: MORE THAN HALF THE DAYS
6. BECOMING EASILY ANNOYED OR IRRITABLE: NEARLY EVERY DAY
3. WORRYING TOO MUCH ABOUT DIFFERENT THINGS: NEARLY EVERY DAY
4. TROUBLE RELAXING: NEARLY EVERY DAY
5. BEING SO RESTLESS THAT IT IS HARD TO SIT STILL: MORE THAN HALF THE DAYS
IF YOU CHECKED OFF ANY PROBLEMS ON THIS QUESTIONNAIRE, HOW DIFFICULT HAVE THESE PROBLEMS MADE IT FOR YOU TO DO YOUR WORK, TAKE CARE OF THINGS AT HOME, OR GET ALONG WITH OTHER PEOPLE: EXTREMELY DIFFICULT
2. NOT BEING ABLE TO STOP OR CONTROL WORRYING: NEARLY EVERY DAY
1. FEELING NERVOUS, ANXIOUS, OR ON EDGE: MORE THAN HALF THE DAYS
GAD7 TOTAL SCORE: 18

## 2023-11-03 ENCOUNTER — VIRTUAL VISIT (OUTPATIENT)
Dept: INTERNAL MEDICINE | Facility: CLINIC | Age: 38
End: 2023-11-03
Payer: COMMERCIAL

## 2023-11-03 ENCOUNTER — TELEPHONE (OUTPATIENT)
Dept: INTERNAL MEDICINE | Facility: CLINIC | Age: 38
End: 2023-11-03

## 2023-11-03 DIAGNOSIS — F41.9 ANXIETY: Primary | ICD-10-CM

## 2023-11-03 DIAGNOSIS — F33.0 MILD RECURRENT MAJOR DEPRESSION (H): ICD-10-CM

## 2023-11-03 PROCEDURE — 99442 PR PHYSICIAN TELEPHONE EVALUATION 11-20 MIN: CPT | Performed by: INTERNAL MEDICINE

## 2023-11-03 ASSESSMENT — ENCOUNTER SYMPTOMS
NERVOUS/ANXIOUS: 1
GASTROINTESTINAL NEGATIVE: 1
RESPIRATORY NEGATIVE: 1
CARDIOVASCULAR NEGATIVE: 1
DECREASED CONCENTRATION: 1
CONSTITUTIONAL NEGATIVE: 1

## 2023-11-03 NOTE — PROGRESS NOTES
Aleah is a 38 year old who is being evaluated via a billable telephone visit.      What phone number would you like to be contacted at? [phone]  How would you like to obtain your AVS? Jose    Distant Location (provider location):  On-site    Assessment & Plan     Anxiety and Mild recurrent major depression (H24)  At this time, patient is still having some struggles with her anxiety and depression, but she did just recently transition over to fluoxetine for ongoing management of her mood.  Patient is aware that it can take some time to see the full effect from the fluoxetine medication.  We will continue fluoxetine 10 mg by mouth per day for the time being.  Side effects of SSRI use were reviewed.  Patient also has a scription for lorazepam 0.5 mg to be taken by mouth once per day as needed for anxiety.  Patient does have a follow-up visit within the next week to assess her response to this transition and her medication regimen.  We will complete [facility] paperwork to excuse her for her leave of absence from work starting from October 30 with a return to work date of November 13, 2023.    Prescription drug management  12 minutes spent by me on the date of the encounter doing chart review, history and exam, documentation and further activities per the note       See Patient Instructions    Jayesh Berumen MD  [facility]    Barrett Bullard is a 38 year old, presenting for the following health issues:  Forms ([facility] paper work for mental health)      Patient is a 38-year-old female who participates in a virtual visit to discuss [facility] paperwork.  She was last seen on October 6 via a virtual visit to discuss ongoing issues with anxiety and depression.  Patient had previously been taking Effexor, but she did not feel that it was as effective as it had been in the past.  We did arrange a plan for her to titrate off of her Effexor and we will proceed with fluoxetine 10 mg daily for ongoing  management of her mood.  Patient has recently transition from Effexor to fluoxetine within the last week.  She has not yet noted any major changes to her mood.  Patient is requesting [facility] paperwork completed to excuse her from work while she continues to deal with her mental health issues.  She would like to have her paperwork to document a leave of absence from her place of employment starting from October 30, 2023 with a return to work date scheduled for November 13, 2023.  Patient has no other major concerns or issues at this time.    History of Present Illness       Mental Health Follow-up:  Patient presents to follow-up on Depression & Anxiety.Patient's depression since last visit has been:  Worse  The patient is having other symptoms associated with depression.  Patient's anxiety since last visit has been:  Bad  The patient is having other symptoms associated with anxiety.  Any significant life events: No  Patient is feeling anxious or having panic attacks.  Patient has no concerns about alcohol or drug use.    She eats 2-3 servings of fruits and vegetables daily.She consumes 0 sweetened beverage(s) daily.She exercises with enough effort to increase her heart rate 9 or less minutes per day.  She exercises with enough effort to increase her heart rate 3 or less days per week. She is missing 1 dose(s) of medications per week.  She is not taking prescribed medications regularly due to remembering to take.         Review of Systems   Constitutional: Negative.    HENT: Negative.     Respiratory: Negative.     Cardiovascular: Negative.    Gastrointestinal: Negative.    Psychiatric/Behavioral:  Positive for decreased concentration. The patient is nervous/anxious.           Objective         Vitals:  No vitals were obtained today due to virtual visit.    Physical Exam   healthy, alert, and no distress  PSYCH: Alert and oriented times 3; coherent speech, normal   rate and volume, able to articulate logical thoughts,  able   to abstract reason, no tangential thoughts, no hallucinations   or delusions  Her affect is normal  RESP: No cough, no audible wheezing, able to talk in full sentences  Remainder of exam unable to be completed due to telephone visits       Phone call duration: 12 minutes

## 2023-11-07 ENCOUNTER — MYC MEDICAL ADVICE (OUTPATIENT)
Dept: INTERNAL MEDICINE | Facility: CLINIC | Age: 38
End: 2023-11-07
Payer: COMMERCIAL

## 2023-11-07 NOTE — LETTER
November 8, 2023      Zee Miller  [address] [place] [id number]        To Whom It May Concern:    Zee Miller is a patient seen in our clinic.  She is currently struggling with some acute medical issues, and I do feel that it would be in her best interest to extend her leave of absence from work until November 20, 2023.      Sincerely,        Jayesh Berumen MD

## 2023-11-08 NOTE — TELEPHONE ENCOUNTER
I have dictated a brief letter requesting that her leave from her place of employment be extended until November 20, 2023.  Given that we are extending her leave of absence, she should not have to have a follow-up visit on Friday the 10th 2023.

## 2023-11-09 ENCOUNTER — TELEPHONE (OUTPATIENT)
Dept: INTERNAL MEDICINE | Facility: CLINIC | Age: 38
End: 2023-11-09
Payer: COMMERCIAL

## 2023-11-09 NOTE — TELEPHONE ENCOUNTER
Pt informed via [employer] of provider's message below.    Message sent asking if she wants appointment tomorrow cancelled.

## 2023-11-09 NOTE — TELEPHONE ENCOUNTER
[facility] * attending physicians statement behavioral treating provider received via fax     Forms in [employer] mail box for review and signature.       (Also in this packets was a JENNIFER request. This was faxed to med records 11-9-2023  )

## 2023-11-17 ENCOUNTER — VIRTUAL VISIT (OUTPATIENT)
Dept: INTERNAL MEDICINE | Facility: CLINIC | Age: 38
End: 2023-11-17
Payer: COMMERCIAL

## 2023-11-17 DIAGNOSIS — F41.9 ANXIETY: Primary | ICD-10-CM

## 2023-11-17 DIAGNOSIS — F33.0 MILD RECURRENT MAJOR DEPRESSION (H): ICD-10-CM

## 2023-11-17 PROCEDURE — 99443 PR PHYSICIAN TELEPHONE EVALUATION 21-30 MIN: CPT | Performed by: INTERNAL MEDICINE

## 2023-11-17 RX ORDER — LORAZEPAM 0.5 MG/1
0.5 TABLET ORAL DAILY PRN
Qty: 30 TABLET | Refills: 0 | Status: SHIPPED | OUTPATIENT
Start: 2023-11-17 | End: 2024-06-04

## 2023-11-17 ASSESSMENT — ANXIETY QUESTIONNAIRES
3. WORRYING TOO MUCH ABOUT DIFFERENT THINGS: MORE THAN HALF THE DAYS
1. FEELING NERVOUS, ANXIOUS, OR ON EDGE: NEARLY EVERY DAY
6. BECOMING EASILY ANNOYED OR IRRITABLE: NEARLY EVERY DAY
GAD7 TOTAL SCORE: 16
GAD7 TOTAL SCORE: 16
5. BEING SO RESTLESS THAT IT IS HARD TO SIT STILL: NOT AT ALL
7. FEELING AFRAID AS IF SOMETHING AWFUL MIGHT HAPPEN: NEARLY EVERY DAY
2. NOT BEING ABLE TO STOP OR CONTROL WORRYING: MORE THAN HALF THE DAYS
IF YOU CHECKED OFF ANY PROBLEMS ON THIS QUESTIONNAIRE, HOW DIFFICULT HAVE THESE PROBLEMS MADE IT FOR YOU TO DO YOUR WORK, TAKE CARE OF THINGS AT HOME, OR GET ALONG WITH OTHER PEOPLE: EXTREMELY DIFFICULT
4. TROUBLE RELAXING: NEARLY EVERY DAY

## 2023-11-17 ASSESSMENT — PATIENT HEALTH QUESTIONNAIRE - PHQ9
SUM OF ALL RESPONSES TO PHQ QUESTIONS 1-9: 18
SUM OF ALL RESPONSES TO PHQ QUESTIONS 1-9: 18
10. IF YOU CHECKED OFF ANY PROBLEMS, HOW DIFFICULT HAVE THESE PROBLEMS MADE IT FOR YOU TO DO YOUR WORK, TAKE CARE OF THINGS AT HOME, OR GET ALONG WITH OTHER PEOPLE: EXTREMELY DIFFICULT

## 2023-11-17 ASSESSMENT — ENCOUNTER SYMPTOMS
SLEEP DISTURBANCE: 0
RESPIRATORY NEGATIVE: 1
CARDIOVASCULAR NEGATIVE: 1
GASTROINTESTINAL NEGATIVE: 1
CONSTITUTIONAL NEGATIVE: 1
NERVOUS/ANXIOUS: 1

## 2023-11-17 NOTE — TELEPHONE ENCOUNTER
Printed out workability form and gave to KAREN Jennings as patient has appointment with Dr. Berumen this afternoon.    Thank you,  Rolando Romero, Triage RN [place]  2:17 PM 11/17/2023

## 2023-11-17 NOTE — LETTER
November 17, 2023      Zee Miller  [address] [place] [id number]        To Whom It May Concern:    Zee Miller is a patient seen in our clinic.  She is continuing to struggle with some acute medical issues, and I do feel that it would be in her best interest to extend her leave of absence from work until November 27, 2023.      Sincerely,        Jayesh Berumen MD

## 2023-11-17 NOTE — PROGRESS NOTES
Aleah is a 38 year old who is being evaluated via a billable telephone visit.      What phone number would you like to be contacted at? [phone]  How would you like to obtain your AVS? Jose    Distant Location (provider location):  On-site    Assessment & Plan     Anxiety and mild recurrent major pression  At this time, patient does appear to continue to be struggling with her anxiety and depression.  She did complete a PHQ-9 form today, and her score was noted to be 18.  She did not complete a DANILO-7 score sheet.  Given that she is continue to struggle with her anxiety, we did elect to increase her fluoxetine from 10 mg daily up to 20 mg daily.  Side effects of SSRI use were reviewed.  I will also provide her with a refill of her lorazepam prescription with instructions take 0.5 mg tablets by mouth once per day as needed for breakthrough anxiety.  Side effects benzodiazepine use were reviewed.  Her leave from work will be extended 1 additional week.  I will provide her with a letter stating that her current return to work date is anticipated to be November 27, 2023.  - FLUoxetine (PROZAC) 20 MG capsule; Take 1 capsule (20 mg) by mouth daily  - LORazepam (ATIVAN) 0.5 MG tablet; Take 1 tablet (0.5 mg) by mouth daily as needed for anxiety (and panic attacks)      Prescription drug management  21 minutes spent by me on the date of the encounter doing chart review, history and exam, documentation and further activities per the note       Depression Screening Follow Up        11/17/2023    11:05 AM   PHQ   PHQ-9 Total Score 18   Q9: Thoughts of better off dead/self-harm past 2 weeks Several days   F/U: Thoughts of suicide or self-harm No   F/U: Safety concerns No       See Patient Instructions    Jayesh Berumen MD  [facility]    Barrett Bullard is a 38 year old, presenting for the following health issues:  Forms (FMLA)      Patient is a 38-year-old  female who presents  in virtual visit for follow-up of her anxiety and depression.  Patient has been struggling with her anxiety and depression as of late.  She has had several visits over the past few weeks as we have been attempting to get her mood under better control.  Patient is currently taking fluoxetine 10 mg by mouth per day along with lorazepam 0.5 mg by mouth per day as needed for anxiety.  Patient reports her anxiety and depression are not very well controlled.  She is still continuing to struggle with her anxiety as she feels that she is in a constant state of worry.  Patient is having intermittent panic attacks.  Patient states that she has not been able to identify any particular stressors that seem to exacerbate her mood, as these attacks do occur randomly.  She does find her lorazepam has been helpful.  Patient did receive a prescription for lorazepam 0.5 mg tablets with instruction take 1/day as needed for anxiety in January 2023.  She did receive 30 tablets.  Patient states that she did just recently run out of her prescription.  She has been off work as her anxiety depression have made it very difficult to perform the duties required for her job as well as continued anxiety attacks.  Normal activities of daily living.  Patient was scheduled to return to work on Monday, but she is concerned that she is not ready to do so given her    History of Present Illness       Mental Health Follow-up:  Patient presents to follow-up on Depression & Anxiety.Patient's depression since last visit has been:  No change  The patient is having other symptoms associated with depression.  Patient's anxiety since last visit has been:  Worse  The patient is having other symptoms associated with anxiety.  Any significant life events: No  Patient is feeling anxious or having panic attacks.  Patient has no concerns about alcohol or drug use.    She eats 2-3 servings of fruits and vegetables daily.She consumes 0 sweetened beverage(s) daily.She  exercises with enough effort to increase her heart rate 9 or less minutes per day.  She exercises with enough effort to increase her heart rate 3 or less days per week. She is missing 1 dose(s) of medications per week.  She is not taking prescribed medications regularly due to remembering to take.       Review of Systems   Constitutional: Negative.    HENT: Negative.     Respiratory: Negative.     Cardiovascular: Negative.    Gastrointestinal: Negative.    Psychiatric/Behavioral:  Positive for mood changes. Negative for self-injury, sleep disturbance and suicidal ideas. The patient is nervous/anxious.           Objective         Vitals:  No vitals were obtained today due to virtual visit.    Physical Exam   healthy, alert, and no distress  PSYCH: Alert and oriented times 3; coherent speech, normal   rate and volume, able to articulate logical thoughts, able   to abstract reason, no tangential thoughts, no hallucinations   or delusions  Her affect is normal.  PHQ-9 score is 18.  RESP: No cough, no audible wheezing, able to talk in full sentences  Remainder of exam unable to be completed due to telephone visits      Phone call duration: 21 minutes

## 2023-11-24 ENCOUNTER — MYC MEDICAL ADVICE (OUTPATIENT)
Dept: INTERNAL MEDICINE | Facility: CLINIC | Age: 38
End: 2023-11-24
Payer: COMMERCIAL

## 2023-11-27 NOTE — TELEPHONE ENCOUNTER
Form is in 11/17/2023 MyChart encounter. Please print out copy and give to Dr. Berumen.    Thank you,  Rolando Romero, Triage RN [facility]  11:21 AM 11/27/2023

## 2023-11-28 NOTE — TELEPHONE ENCOUNTER
New workability form printed and placed with provider to advise.    Thank you,  Rolando Romero, Triage RN [facility] [place]  11:00 AM 11/28/2023

## 2023-11-30 NOTE — TELEPHONE ENCOUNTER
Form given back to provider in provider's office.     Please see patient's mychart message below.      Please advise, thanks.    Rolando Romero, Triage RN [facility]  1:47 PM 11/30/2023

## 2023-11-30 NOTE — TELEPHONE ENCOUNTER
Form sent to patient's email per patient request. Sent [employer] message to patient.    Rolando Romero, Triage RN [facility] [place]  1:27 PM 11/30/2023

## 2023-11-30 NOTE — TELEPHONE ENCOUNTER
Sent [employer] message to patient.    Rolando Romero, Triage RN [place]  2:28 PM 11/30/2023

## 2023-11-30 NOTE — TELEPHONE ENCOUNTER
Workability form emailed to patient [email or web address] and faxed to confidential fax #[phone]

## 2023-11-30 NOTE — TELEPHONE ENCOUNTER
Sent [employer] message to patient.    Rolando Romero, Triage RN [facility] [place]  12:13 PM 11/30/2023

## 2023-11-30 NOTE — TELEPHONE ENCOUNTER
Email was spelt wrong. Resent to [email or web address]    Sent MyChart message to patient.    Rolando Romero, Triage RN [facility]  3:29 PM 11/30/2023

## 2024-01-27 DIAGNOSIS — F41.9 ANXIETY: ICD-10-CM

## 2024-01-29 RX ORDER — PROPRANOLOL HYDROCHLORIDE 20 MG/1
20 TABLET ORAL 2 TIMES DAILY
Qty: 60 TABLET | Refills: 5 | Status: SHIPPED | OUTPATIENT
Start: 2024-01-29

## 2024-03-07 DIAGNOSIS — F41.9 ANXIETY: ICD-10-CM

## 2024-03-07 DIAGNOSIS — F33.0 MILD RECURRENT MAJOR DEPRESSION (H): ICD-10-CM

## 2024-06-04 ENCOUNTER — MYC REFILL (OUTPATIENT)
Dept: INTERNAL MEDICINE | Facility: CLINIC | Age: 39
End: 2024-06-04
Payer: COMMERCIAL

## 2024-06-04 DIAGNOSIS — F41.9 ANXIETY: ICD-10-CM

## 2024-06-05 RX ORDER — LORAZEPAM 0.5 MG/1
0.5 TABLET ORAL DAILY PRN
Qty: 30 TABLET | Refills: 0 | Status: SHIPPED | OUTPATIENT
Start: 2024-06-05 | End: 2024-09-23

## 2024-07-14 ENCOUNTER — HEALTH MAINTENANCE LETTER (OUTPATIENT)
Age: 39
End: 2024-07-14

## 2024-07-21 DIAGNOSIS — F33.0 MILD RECURRENT MAJOR DEPRESSION (H): ICD-10-CM

## 2024-07-21 DIAGNOSIS — F41.9 ANXIETY: ICD-10-CM

## 2024-09-17 ENCOUNTER — MYC MEDICAL ADVICE (OUTPATIENT)
Dept: INTERNAL MEDICINE | Facility: CLINIC | Age: 39
End: 2024-09-17
Payer: COMMERCIAL

## 2024-09-17 NOTE — TELEPHONE ENCOUNTER
Sent [employer] message to patient to call to schedule appointment with Dr Naranjo once she hears back about what documentation she will need

## 2024-09-19 NOTE — TELEPHONE ENCOUNTER
Left message for patient to call back  Please offer patient an appointment either virtual or in person.  I have the printed form in my green folder.

## 2024-09-20 ASSESSMENT — ANXIETY QUESTIONNAIRES
GAD7 TOTAL SCORE: 12
8. IF YOU CHECKED OFF ANY PROBLEMS, HOW DIFFICULT HAVE THESE MADE IT FOR YOU TO DO YOUR WORK, TAKE CARE OF THINGS AT HOME, OR GET ALONG WITH OTHER PEOPLE?: VERY DIFFICULT
GAD7 TOTAL SCORE: 12
7. FEELING AFRAID AS IF SOMETHING AWFUL MIGHT HAPPEN: MORE THAN HALF THE DAYS
GAD7 TOTAL SCORE: 12

## 2024-09-23 ENCOUNTER — VIRTUAL VISIT (OUTPATIENT)
Dept: INTERNAL MEDICINE | Facility: CLINIC | Age: 39
End: 2024-09-23
Payer: COMMERCIAL

## 2024-09-23 DIAGNOSIS — F41.9 ANXIETY: Primary | ICD-10-CM

## 2024-09-23 DIAGNOSIS — F33.0 MILD RECURRENT MAJOR DEPRESSION (H): ICD-10-CM

## 2024-09-23 PROCEDURE — 99443 PR PHYSICIAN TELEPHONE EVALUATION 21-30 MIN: CPT | Mod: 93 | Performed by: INTERNAL MEDICINE

## 2024-09-23 RX ORDER — LORAZEPAM 0.5 MG/1
0.5 TABLET ORAL DAILY PRN
COMMUNITY
Start: 2024-09-23

## 2024-09-23 RX ORDER — FLUOXETINE 40 MG/1
40 CAPSULE ORAL DAILY
Qty: 90 CAPSULE | Refills: 1 | Status: SHIPPED | OUTPATIENT
Start: 2024-09-23

## 2024-09-23 NOTE — PROGRESS NOTES
Aleah is a 39 year old who is being evaluated via a billable telephone visit.    What phone number would you like to be contacted at? [phone]  How would you like to obtain your AVS? Jose  Originating Location (pt. Location): Home    Distant Location (provider location):  On-site    Assessment & Plan     Anxiety and mild recurrent major depression  At this time, patient does appear to be continuing to struggle with some worsening anxiety and depression symptoms over the past few months.  After some discussion, we did elect to increase her fluoxetine to 40 mg by mouth once per day.  Side effects of SSRIs were reviewed.  Patient will continue her as needed use of lorazepam 0.5 mg once per day for anxiety and panic attacks.  Patient is not in need of any refills of this medication at this time.  I will also assist her with completing paperwork for her employer documenting her need for occasional absences from work due to her mental health.  - FLUoxetine (PROZAC) 40 MG capsule; Take 1 capsule (40 mg) by mouth daily.  - LORazepam (ATIVAN) 0.5 MG tablet; Take 1 tablet (0.5 mg) by mouth daily as needed for anxiety (and panic attacks).            See Patient Instructions    Subjective   Aleah is a 39 year old, presenting for the following health issues:  Forms    Patient is a 39-year-old  female who participates in a virtual visit for follow-up on her anxiety and depression.  Patient has been taking fluoxetine 20 mg by mouth per day as well as lorazepam 0.5 mg once per day as needed for panic attacks.  Patient did feel that her anxiety and depression were under good control until recently when she seems to not be responding to her fluoxetine dose as well.  Patient is reporting times of increased anxiety, panic attacks, and emotional lability.  She is wondering if her fluoxetine dosage should be increased.  Patient also is requesting paperwork to be completed for her employer so that she does not lose her job  over intermittent absences due to her increased anxiety.  Patient states that if she is experiencing increased anxiety or panic attacks that she will typically take it a off from work.  This occurs approximately once per month.    History of Present Illness       Mental Health Follow-up:  Patient presents to follow-up on Depression & Anxiety.Patient's depression since last visit has been:  Worse  The patient is having other symptoms associated with depression.  Patient's anxiety since last visit has been:  Worse  The patient is having other symptoms associated with anxiety.  Any significant life events: job concerns  Patient is feeling anxious or having panic attacks.  Patient has no concerns about alcohol or drug use.    She eats 2-3 servings of fruits and vegetables daily.She consumes 0 sweetened beverage(s) daily.She exercises with enough effort to increase her heart rate 9 or less minutes per day.  She exercises with enough effort to increase her heart rate 3 or less days per week. She is missing 2 dose(s) of medications per week.  She is not taking prescribed medications regularly due to remembering to take.         Review of Systems  CONSTITUTIONAL: NEGATIVE for fever, chills, change in weight  INTEGUMENTARY/SKIN: NEGATIVE for worrisome rashes, moles or lesions  ENT/MOUTH: NEGATIVE for ear, mouth and throat problems  RESP: NEGATIVE for significant cough or SOB  CV: NEGATIVE for chest pain, palpitations or peripheral edema  GI: NEGATIVE for nausea, abdominal pain, heartburn, or change in bowel habits  NEURO: NEGATIVE for weakness, dizziness or paresthesias  PSYCHIATRIC: Positive for anxiety and depression.      Objective         Vitals:  No vitals were obtained today due to virtual visit.    Physical Exam   General: Alert and no distress //Respiratory: No audible wheeze, cough, or shortness of breath // Psychiatric:  Appropriate affect, tone, and pace of words        Phone call duration: 20 minutes  Signed  Electronically by: Jayesh Berumen MD

## 2024-09-26 ENCOUNTER — MYC MEDICAL ADVICE (OUTPATIENT)
Dept: INTERNAL MEDICINE | Facility: CLINIC | Age: 39
End: 2024-09-26
Payer: COMMERCIAL

## 2024-11-08 ENCOUNTER — MYC REFILL (OUTPATIENT)
Dept: INTERNAL MEDICINE | Facility: CLINIC | Age: 39
End: 2024-11-08

## 2024-11-08 DIAGNOSIS — F41.9 ANXIETY: ICD-10-CM

## 2024-11-08 NOTE — TELEPHONE ENCOUNTER
"Had last VV 9/23/24 \"Patient will continue her as needed use of lorazepam 0.5 mg once per day for anxiety and panic attacks. \"    Summer RN 9:26 AM November 8, 2024   [facility]    "

## 2024-11-08 NOTE — TELEPHONE ENCOUNTER
Clinic RN: Please investigate patient's chart or contact patient if the information cannot be found because the medication is listed as historical or discontinued. Confirm patient is taking this medication. Document findings and route refill encounter to provider for approval or denial.    Erick Butler, RN, BSN, MSN  RN Lead

## 2024-11-09 RX ORDER — LORAZEPAM 0.5 MG/1
0.5 TABLET ORAL DAILY PRN
Qty: 30 TABLET | Refills: 0 | Status: SHIPPED | OUTPATIENT
Start: 2024-11-09

## 2024-11-24 DIAGNOSIS — F41.9 ANXIETY: ICD-10-CM

## 2024-11-25 RX ORDER — PROPRANOLOL HCL 20 MG
20 TABLET ORAL 2 TIMES DAILY
Qty: 60 TABLET | Refills: 2 | Status: SHIPPED | OUTPATIENT
Start: 2024-11-25

## 2025-03-24 ENCOUNTER — MYC REFILL (OUTPATIENT)
Dept: INTERNAL MEDICINE | Facility: CLINIC | Age: 40
End: 2025-03-24
Payer: COMMERCIAL

## 2025-03-24 DIAGNOSIS — F41.9 ANXIETY: ICD-10-CM

## 2025-03-24 RX ORDER — LORAZEPAM 0.5 MG/1
0.5 TABLET ORAL DAILY PRN
Qty: 30 TABLET | Refills: 0 | Status: SHIPPED | OUTPATIENT
Start: 2025-03-24

## 2025-05-06 ASSESSMENT — ANXIETY QUESTIONNAIRES
7. FEELING AFRAID AS IF SOMETHING AWFUL MIGHT HAPPEN: SEVERAL DAYS
IF YOU CHECKED OFF ANY PROBLEMS ON THIS QUESTIONNAIRE, HOW DIFFICULT HAVE THESE PROBLEMS MADE IT FOR YOU TO DO YOUR WORK, TAKE CARE OF THINGS AT HOME, OR GET ALONG WITH OTHER PEOPLE: EXTREMELY DIFFICULT
GAD7 TOTAL SCORE: 13
1. FEELING NERVOUS, ANXIOUS, OR ON EDGE: NEARLY EVERY DAY
3. WORRYING TOO MUCH ABOUT DIFFERENT THINGS: NEARLY EVERY DAY
4. TROUBLE RELAXING: MORE THAN HALF THE DAYS
8. IF YOU CHECKED OFF ANY PROBLEMS, HOW DIFFICULT HAVE THESE MADE IT FOR YOU TO DO YOUR WORK, TAKE CARE OF THINGS AT HOME, OR GET ALONG WITH OTHER PEOPLE?: EXTREMELY DIFFICULT
5. BEING SO RESTLESS THAT IT IS HARD TO SIT STILL: NOT AT ALL
2. NOT BEING ABLE TO STOP OR CONTROL WORRYING: NEARLY EVERY DAY
6. BECOMING EASILY ANNOYED OR IRRITABLE: SEVERAL DAYS
GAD7 TOTAL SCORE: 13
7. FEELING AFRAID AS IF SOMETHING AWFUL MIGHT HAPPEN: SEVERAL DAYS
GAD7 TOTAL SCORE: 13

## 2025-05-07 ENCOUNTER — OFFICE VISIT (OUTPATIENT)
Dept: INTERNAL MEDICINE | Facility: CLINIC | Age: 40
End: 2025-05-07
Payer: COMMERCIAL

## 2025-05-07 VITALS
RESPIRATION RATE: 16 BRPM | DIASTOLIC BLOOD PRESSURE: 83 MMHG | SYSTOLIC BLOOD PRESSURE: 130 MMHG | TEMPERATURE: 99.1 F | BODY MASS INDEX: 26.44 KG/M2 | HEART RATE: 80 BPM | OXYGEN SATURATION: 97 % | WEIGHT: 173.9 LBS

## 2025-05-07 DIAGNOSIS — R41.840 ATTENTION DEFICIT: ICD-10-CM

## 2025-05-07 DIAGNOSIS — F41.9 ANXIETY: ICD-10-CM

## 2025-05-07 DIAGNOSIS — F33.1 MODERATE RECURRENT MAJOR DEPRESSION (H): Primary | ICD-10-CM

## 2025-05-07 DIAGNOSIS — R29.898 WEAKNESS OF LEFT SHOULDER: ICD-10-CM

## 2025-05-07 PROBLEM — F33.0 MILD RECURRENT MAJOR DEPRESSION: Status: RESOLVED | Noted: 2021-11-21 | Resolved: 2025-05-07

## 2025-05-07 PROCEDURE — 99214 OFFICE O/P EST MOD 30 MIN: CPT | Performed by: INTERNAL MEDICINE

## 2025-05-07 PROCEDURE — 1125F AMNT PAIN NOTED PAIN PRSNT: CPT | Performed by: INTERNAL MEDICINE

## 2025-05-07 PROCEDURE — 3075F SYST BP GE 130 - 139MM HG: CPT | Performed by: INTERNAL MEDICINE

## 2025-05-07 PROCEDURE — 3079F DIAST BP 80-89 MM HG: CPT | Performed by: INTERNAL MEDICINE

## 2025-05-07 RX ORDER — FLUOXETINE HYDROCHLORIDE 40 MG/1
40 CAPSULE ORAL DAILY
Qty: 90 CAPSULE | Refills: 1 | Status: SHIPPED | OUTPATIENT
Start: 2025-05-07

## 2025-05-07 ASSESSMENT — ENCOUNTER SYMPTOMS: NERVOUS/ANXIOUS: 1

## 2025-05-07 ASSESSMENT — PAIN SCALES - GENERAL: PAINLEVEL_OUTOF10: MILD PAIN (3)

## 2025-05-07 NOTE — PROGRESS NOTES
Assessment & Plan     Moderate recurrent major depression (H) and anxiety  At this time, patient was reluctant to make any changes to her medication regimen for management of her anxiety and depression.  We did elect to continue her fluoxetine at 40 mg daily.  Side effects of SSRIs were reviewed.  Patient will also continue her as needed use of lorazepam 0.5 mg per mouth once per day as needed for increased anxiety or panic attacks.  Patient did not need a refill of her lorazepam at this time.  We will continue to monitor for any changes in her mood.  Patient is aware that she can contact the clinic should she have any questions or concerns about her medication or mood.  - TSH with free T4 reflex; Future  - Basic metabolic panel  (Ca, Cl, CO2, Creat, Gluc, K, Na, BUN); Future  - CBC with platelets and differential; Future  - FLUoxetine (PROZAC) 40 MG capsule; Take 1 capsule (40 mg) by mouth daily.    Weakness of left shoulder  Given her reported history of a fall and weakness in her left shoulder, we did elect to proceed with x-ray imaging as the first after your evaluation of this issue.  X-rays of her left shoulder are currently pending.  Patient will be contacted once they are available for review.  - XR Shoulder Left G/E 3 Views; Future    Attention deficit  It is possible that her issues with her attention could be related to her depression and anxiety.  Patient did express a desire to have further evaluation completed for possible ADHD as she has had longstanding issues with her attention and focus.  A referral to psychology was placed today.  - Adult Mental Health  Referral; Future            Follow-up    Follow-up Visit   Expected date:  Nov 07, 2025 (Approximate)      Follow Up Appointment Details:     Follow-up with whom?: PCP    Follow-Up for what?: Chronic Disease f/u    Chronic Disease f/u: Depression    How?: In Person or Virtual                 Subjective   Aleah is a 39 year old,  "presenting for the following health issues:  Depression, Anxiety, and Shoulder Injury        5/7/2025     4:12 PM   Additional Questions   Roomed by hope r   Accompanied by self         5/7/2025     4:12 PM   Patient Reported Additional Medications   Patient reports taking the following new medications n/a     Patient is a 39-year-old  female who presents to the clinic for medication follow-up visit.  She has had longstanding issues of anxiety and depression.  Patient has been taking fluoxetine 40 mg daily along with lorazepam 0.5 mg tablet once per day as needed for breakthrough anxiety.  Patient states that she will typically utilize this medication once or twice per week.  She does  feel that she is still having some issues with increased anxiety and depressed mood, but she does not wish to make any medication changes at this time.  Patient states that she does not want to go through the process of trying to find a new medication for her mood that would be helpful.  Her other major concerns today are in regards to her left shoulder and her attention difficulties.  In regards to her shoulder, patient states that she did suffer a fall landing on her left side in August 2024.  Since that time, she has had persistent issues with weakness in the left upper extremity.  She does feel that her left shoulder \"sticks out\" when compared to her right.  Patient is left-handed.  In regards to her attention difficulties, she reports that she has always had difficulties with paying attention and maintaining focus.  She has received multiple lines of replacement, due to issues with her attention.  She is wondering if she can get a referral to be evaluated for possible ADHD.    Anxiety    History of Present Illness       Mental Health Follow-up:  Patient presents to follow-up on Depression & Anxiety.Patient's depression since last visit has been:  Worse  The patient is having other symptoms associated with " depression.  Patient's anxiety since last visit has been:  Worse  The patient is having other symptoms associated with anxiety.  Any significant life events: other  Patient is feeling anxious or having panic attacks.  Patient has no concerns about alcohol or drug use.    She eats 2-3 servings of fruits and vegetables daily.She consumes 0 sweetened beverage(s) daily.She exercises with enough effort to increase her heart rate 9 or less minutes per day.  She exercises with enough effort to increase her heart rate 3 or less days per week. She is missing 1 dose(s) of medications per week.  She is not taking prescribed medications regularly due to other.            Review of Systems  CONSTITUTIONAL: NEGATIVE for fever, chills, change in weight  INTEGUMENTARY/SKIN: NEGATIVE for worrisome rashes, moles or lesions  ENT/MOUTH: NEGATIVE for ear, mouth and throat problems  RESP: NEGATIVE for significant cough or SOB  CV: NEGATIVE for chest pain, palpitations or peripheral edema  GI: NEGATIVE for nausea, abdominal pain, heartburn, or change in bowel habits  MUSCULOSKELETAL: Positive for left shoulder weakness.  NEURO: NEGATIVE for dizziness or paresthesias  PSYCHIATRIC: Positive for anxiety and depression.       Objective    /83 (BP Location: Right arm, Patient Position: Sitting, Cuff Size: Adult Regular)   Pulse 80   Temp 99.1  F (37.3  C) (Tympanic)   Resp 16   Wt 78.9 kg (173 lb 14.4 oz)   SpO2 97%   Breastfeeding No   BMI 26.44 kg/m    Body mass index is 26.44 kg/m .  Physical Exam  Vitals reviewed.   HENT:      Head: Normocephalic and atraumatic.      Mouth/Throat:      Mouth: Mucous membranes are moist.      Pharynx: Oropharynx is clear.   Eyes:      Extraocular Movements: Extraocular movements intact.      Conjunctiva/sclera: Conjunctivae normal.      Pupils: Pupils are equal, round, and reactive to light.   Cardiovascular:      Rate and Rhythm: Normal rate and regular rhythm.      Pulses: Normal pulses.       Heart sounds: Normal heart sounds.   Pulmonary:      Effort: Pulmonary effort is normal.      Breath sounds: Normal breath sounds.   Musculoskeletal:      Right shoulder: Normal.      Left shoulder: Deformity present.      Comments: Prominent AC joint noted on left side. Left upper extremity weakness noted when compared with right upper extremity.   Skin:     General: Skin is warm and dry.      Capillary Refill: Capillary refill takes less than 2 seconds.   Neurological:      Mental Status: She is alert.   Psychiatric:         Attention and Perception: Attention normal.         Mood and Affect: Mood is anxious.         Speech: Speech is rapid and pressured.        Diagnostic testing: BMP, CBC, and TSH are pending. X rays of left shoulder, 2-3 views, are pending.        Signed Electronically by: Jayesh Berumen MD

## 2025-05-08 ENCOUNTER — PATIENT OUTREACH (OUTPATIENT)
Dept: CARE COORDINATION | Facility: CLINIC | Age: 40
End: 2025-05-08
Payer: COMMERCIAL

## 2025-05-12 ENCOUNTER — PATIENT OUTREACH (OUTPATIENT)
Dept: CARE COORDINATION | Facility: CLINIC | Age: 40
End: 2025-05-12

## 2025-05-28 ENCOUNTER — ANCILLARY PROCEDURE (OUTPATIENT)
Dept: GENERAL RADIOLOGY | Facility: CLINIC | Age: 40
End: 2025-05-28
Attending: INTERNAL MEDICINE
Payer: COMMERCIAL

## 2025-05-28 DIAGNOSIS — R29.898 WEAKNESS OF LEFT SHOULDER: ICD-10-CM

## 2025-05-28 PROCEDURE — 73030 X-RAY EXAM OF SHOULDER: CPT | Mod: TC | Performed by: RADIOLOGY

## 2025-05-29 ENCOUNTER — RESULTS FOLLOW-UP (OUTPATIENT)
Dept: INTERNAL MEDICINE | Facility: CLINIC | Age: 40
End: 2025-05-29

## 2025-06-19 ENCOUNTER — PATIENT OUTREACH (OUTPATIENT)
Dept: CARE COORDINATION | Facility: CLINIC | Age: 40
End: 2025-06-19
Payer: COMMERCIAL

## 2025-07-19 ENCOUNTER — HEALTH MAINTENANCE LETTER (OUTPATIENT)
Age: 40
End: 2025-07-19

## 2025-08-06 ENCOUNTER — OFFICE VISIT (OUTPATIENT)
Dept: INTERNAL MEDICINE | Facility: CLINIC | Age: 40
End: 2025-08-06
Payer: COMMERCIAL

## 2025-08-06 ENCOUNTER — OFFICE VISIT (OUTPATIENT)
Dept: PEDIATRICS | Facility: CLINIC | Age: 40
End: 2025-08-06
Payer: COMMERCIAL

## 2025-08-06 VITALS
WEIGHT: 173.28 LBS | TEMPERATURE: 98.1 F | BODY MASS INDEX: 26.26 KG/M2 | DIASTOLIC BLOOD PRESSURE: 87 MMHG | HEART RATE: 92 BPM | SYSTOLIC BLOOD PRESSURE: 126 MMHG | RESPIRATION RATE: 20 BRPM | OXYGEN SATURATION: 96 % | HEIGHT: 68 IN

## 2025-08-06 VITALS
WEIGHT: 173.2 LBS | OXYGEN SATURATION: 100 % | RESPIRATION RATE: 18 BRPM | SYSTOLIC BLOOD PRESSURE: 135 MMHG | HEART RATE: 106 BPM | DIASTOLIC BLOOD PRESSURE: 88 MMHG | BODY MASS INDEX: 26.25 KG/M2 | TEMPERATURE: 99 F | HEIGHT: 68 IN

## 2025-08-06 DIAGNOSIS — R11.2 NAUSEA AND VOMITING, UNSPECIFIED VOMITING TYPE: Primary | ICD-10-CM

## 2025-08-06 DIAGNOSIS — E86.0 DEHYDRATION: Primary | ICD-10-CM

## 2025-08-06 LAB
ALBUMIN SERPL BCG-MCNC: 4.5 G/DL (ref 3.5–5.2)
ALP SERPL-CCNC: 61 U/L (ref 40–150)
ALT SERPL W P-5'-P-CCNC: 41 U/L (ref 0–50)
ANION GAP SERPL CALCULATED.3IONS-SCNC: 14 MMOL/L (ref 7–15)
AST SERPL W P-5'-P-CCNC: 22 U/L (ref 0–45)
BASOPHILS # BLD AUTO: 0 10E3/UL (ref 0–0.2)
BASOPHILS NFR BLD AUTO: 1 %
BILIRUB SERPL-MCNC: 0.7 MG/DL
BUN SERPL-MCNC: 9 MG/DL (ref 6–20)
CALCIUM SERPL-MCNC: 9.3 MG/DL (ref 8.8–10.4)
CHLORIDE SERPL-SCNC: 103 MMOL/L (ref 98–107)
CREAT SERPL-MCNC: 0.67 MG/DL (ref 0.51–0.95)
EGFRCR SERPLBLD CKD-EPI 2021: >90 ML/MIN/1.73M2
EOSINOPHIL # BLD AUTO: 0.2 10E3/UL (ref 0–0.7)
EOSINOPHIL NFR BLD AUTO: 3 %
ERYTHROCYTE [DISTWIDTH] IN BLOOD BY AUTOMATED COUNT: 12.4 % (ref 10–15)
GLUCOSE SERPL-MCNC: 94 MG/DL (ref 70–99)
HCO3 SERPL-SCNC: 20 MMOL/L (ref 22–29)
HCT VFR BLD AUTO: 43.6 % (ref 35–47)
HGB BLD-MCNC: 15.1 G/DL (ref 11.7–15.7)
IMM GRANULOCYTES # BLD: 0 10E3/UL
IMM GRANULOCYTES NFR BLD: 0 %
LYMPHOCYTES # BLD AUTO: 1.6 10E3/UL (ref 0.8–5.3)
LYMPHOCYTES NFR BLD AUTO: 22 %
MCH RBC QN AUTO: 30.6 PG (ref 26.5–33)
MCHC RBC AUTO-ENTMCNC: 34.6 G/DL (ref 31.5–36.5)
MCV RBC AUTO: 88 FL (ref 78–100)
MONOCYTES # BLD AUTO: 0.5 10E3/UL (ref 0–1.3)
MONOCYTES NFR BLD AUTO: 6 %
NEUTROPHILS # BLD AUTO: 5.2 10E3/UL (ref 1.6–8.3)
NEUTROPHILS NFR BLD AUTO: 68 %
NRBC # BLD AUTO: 0 10E3/UL
NRBC BLD AUTO-RTO: 0 /100
PLATELET # BLD AUTO: 294 10E3/UL (ref 150–450)
POTASSIUM SERPL-SCNC: 4 MMOL/L (ref 3.4–5.3)
PROT SERPL-MCNC: 7.1 G/DL (ref 6.4–8.3)
RBC # BLD AUTO: 4.94 10E6/UL (ref 3.8–5.2)
SODIUM SERPL-SCNC: 137 MMOL/L (ref 135–145)
WBC # BLD AUTO: 7.5 10E3/UL (ref 4–11)

## 2025-08-06 PROCEDURE — 3075F SYST BP GE 130 - 139MM HG: CPT | Performed by: INTERNAL MEDICINE

## 2025-08-06 PROCEDURE — 99207 REFERRAL TO ACUTE AND DIAGNOSTIC SERVICES: CPT | Performed by: INTERNAL MEDICINE

## 2025-08-06 PROCEDURE — 3079F DIAST BP 80-89 MM HG: CPT | Performed by: INTERNAL MEDICINE

## 2025-08-06 RX ORDER — ONDANSETRON 4 MG/1
4 TABLET, ORALLY DISINTEGRATING ORAL
Status: ACTIVE | OUTPATIENT
Start: 2025-08-06 | End: 2025-08-07

## 2025-08-06 RX ORDER — ONDANSETRON 4 MG/1
4 TABLET, ORALLY DISINTEGRATING ORAL EVERY 8 HOURS PRN
Qty: 12 TABLET | Refills: 0 | Status: SHIPPED | OUTPATIENT
Start: 2025-08-06

## 2025-08-06 RX ADMIN — ONDANSETRON 4 MG: 4 TABLET, ORALLY DISINTEGRATING ORAL at 17:04

## 2025-08-06 ASSESSMENT — ENCOUNTER SYMPTOMS: NAUSEA: 1

## 2025-08-06 ASSESSMENT — PAIN SCALES - GENERAL: PAINLEVEL_OUTOF10: NO PAIN (0)

## 2025-08-08 DIAGNOSIS — F41.9 ANXIETY: ICD-10-CM

## 2025-08-09 RX ORDER — LORAZEPAM 0.5 MG/1
0.5 TABLET ORAL DAILY PRN
Qty: 30 TABLET | Refills: 0 | Status: SHIPPED | OUTPATIENT
Start: 2025-08-09